# Patient Record
Sex: MALE | Race: WHITE | Employment: OTHER | URBAN - NONMETROPOLITAN AREA
[De-identification: names, ages, dates, MRNs, and addresses within clinical notes are randomized per-mention and may not be internally consistent; named-entity substitution may affect disease eponyms.]

---

## 2017-04-05 ENCOUNTER — HOSPITAL ENCOUNTER (EMERGENCY)
Age: 37
Discharge: HOME | End: 2017-04-05
Payer: MEDICAID

## 2017-04-05 ENCOUNTER — APPOINTMENT (OUTPATIENT)
Dept: CT IMAGING | Age: 37
End: 2017-04-05
Payer: MEDICAID

## 2017-04-05 VITALS
RESPIRATION RATE: 18 BRPM | OXYGEN SATURATION: 96 % | DIASTOLIC BLOOD PRESSURE: 76 MMHG | HEART RATE: 88 BPM | SYSTOLIC BLOOD PRESSURE: 132 MMHG | BODY MASS INDEX: 31.39 KG/M2 | TEMPERATURE: 97.6 F | HEIGHT: 67 IN | WEIGHT: 200 LBS

## 2017-04-05 DIAGNOSIS — R10.84 GENERALIZED ABDOMINAL PAIN: Primary | ICD-10-CM

## 2017-04-05 DIAGNOSIS — R74.8 ELEVATED LIPASE: ICD-10-CM

## 2017-04-05 DIAGNOSIS — F10.920 ACUTE ALCOHOLIC INTOXICATION, UNCOMPLICATED (HCC): ICD-10-CM

## 2017-04-05 LAB
ACETAMINOPHEN LEVEL: <15 UG/ML
ALBUMIN SERPL-MCNC: 4.6 G/DL (ref 3.5–5.2)
ALP BLD-CCNC: 97 U/L (ref 40–130)
ALT SERPL-CCNC: 35 U/L (ref 5–41)
AMPHETAMINE SCREEN, URINE: NEGATIVE
ANION GAP SERPL CALCULATED.3IONS-SCNC: 16 MMOL/L (ref 7–19)
AST SERPL-CCNC: 34 U/L (ref 5–40)
BARBITURATE SCREEN URINE: NEGATIVE
BASOPHILS ABSOLUTE: 0 K/UL (ref 0–0.2)
BASOPHILS RELATIVE PERCENT: 0.4 % (ref 0–1)
BENZODIAZEPINE SCREEN, URINE: NEGATIVE
BILIRUB SERPL-MCNC: <0.2 MG/DL (ref 0.2–1.2)
BILIRUBIN URINE: NEGATIVE
BLOOD, URINE: NEGATIVE
BUN BLDV-MCNC: 6 MG/DL (ref 6–20)
CALCIUM SERPL-MCNC: 8.8 MG/DL (ref 8.6–10)
CANNABINOID SCREEN URINE: NEGATIVE
CHLORIDE BLD-SCNC: 109 MMOL/L (ref 98–111)
CLARITY: CLEAR
CO2: 22 MMOL/L (ref 22–29)
COCAINE METABOLITE SCREEN URINE: NEGATIVE
COLOR: NORMAL
CREAT SERPL-MCNC: 0.8 MG/DL (ref 0.5–1.2)
EOSINOPHILS ABSOLUTE: 0.1 K/UL (ref 0–0.6)
EOSINOPHILS RELATIVE PERCENT: 1.8 % (ref 0–5)
ETHANOL: 219 MG/DL (ref 0–0.08)
ETHANOL: 310 MG/DL (ref 0–0.08)
GFR NON-AFRICAN AMERICAN: >60
GLOBULIN: 2.8 G/DL
GLUCOSE BLD-MCNC: 110 MG/DL (ref 74–109)
GLUCOSE URINE: NEGATIVE MG/DL
HCT VFR BLD CALC: 44.9 % (ref 42–52)
HEMOGLOBIN: 15.1 G/DL (ref 14–18)
KETONES, URINE: NEGATIVE MG/DL
LACTIC ACID: 1.7 MG/DL (ref 0.5–1.9)
LEUKOCYTE ESTERASE, URINE: NEGATIVE
LIPASE: 75 U/L (ref 13–60)
LYMPHOCYTES ABSOLUTE: 4 K/UL (ref 1.1–4.5)
LYMPHOCYTES RELATIVE PERCENT: 54 % (ref 20–40)
Lab: NORMAL
MCH RBC QN AUTO: 31.8 PG (ref 27–31)
MCHC RBC AUTO-ENTMCNC: 33.6 G/DL (ref 33–37)
MCV RBC AUTO: 94.5 FL (ref 80–94)
MONOCYTES ABSOLUTE: 0.4 K/UL (ref 0–0.9)
MONOCYTES RELATIVE PERCENT: 5.7 % (ref 0–10)
NEUTROPHILS ABSOLUTE: 2.8 K/UL (ref 1.5–7.5)
NEUTROPHILS RELATIVE PERCENT: 37.7 % (ref 50–65)
NITRITE, URINE: NEGATIVE
OPIATE SCREEN URINE: NEGATIVE
PDW BLD-RTO: 13.6 % (ref 11.5–14.5)
PH UA: 7
PLATELET # BLD: 363 K/UL (ref 130–400)
PMV BLD AUTO: 9.5 FL (ref 7.4–10.4)
POTASSIUM SERPL-SCNC: 4.3 MMOL/L (ref 3.5–5)
PROTEIN UA: NEGATIVE MG/DL
RBC # BLD: 4.75 M/UL (ref 4.7–6.1)
SALICYLATE, SERUM: <3 MG/DL (ref 3–10)
SODIUM BLD-SCNC: 147 MMOL/L (ref 136–145)
SPECIFIC GRAVITY UA: 1
TOTAL PROTEIN: 7.4 G/DL (ref 6.6–8.7)
UROBILINOGEN, URINE: 0.2 E.U./DL
WBC # BLD: 7.4 K/UL (ref 4.8–10.8)

## 2017-04-05 PROCEDURE — G0480 DRUG TEST DEF 1-7 CLASSES: HCPCS

## 2017-04-05 PROCEDURE — 2500000003 HC RX 250 WO HCPCS: Performed by: PHYSICIAN ASSISTANT

## 2017-04-05 PROCEDURE — 96366 THER/PROPH/DIAG IV INF ADDON: CPT

## 2017-04-05 PROCEDURE — 36415 COLL VENOUS BLD VENIPUNCTURE: CPT

## 2017-04-05 PROCEDURE — 80053 COMPREHEN METABOLIC PANEL: CPT

## 2017-04-05 PROCEDURE — 74177 CT ABD & PELVIS W/CONTRAST: CPT

## 2017-04-05 PROCEDURE — 85025 COMPLETE CBC W/AUTO DIFF WBC: CPT

## 2017-04-05 PROCEDURE — 2580000003 HC RX 258: Performed by: PHYSICIAN ASSISTANT

## 2017-04-05 PROCEDURE — 81003 URINALYSIS AUTO W/O SCOPE: CPT

## 2017-04-05 PROCEDURE — 83605 ASSAY OF LACTIC ACID: CPT

## 2017-04-05 PROCEDURE — 6360000002 HC RX W HCPCS: Performed by: PHYSICIAN ASSISTANT

## 2017-04-05 PROCEDURE — 80307 DRUG TEST PRSMV CHEM ANLYZR: CPT

## 2017-04-05 PROCEDURE — 96365 THER/PROPH/DIAG IV INF INIT: CPT

## 2017-04-05 PROCEDURE — 96375 TX/PRO/DX INJ NEW DRUG ADDON: CPT

## 2017-04-05 PROCEDURE — 6360000004 HC RX CONTRAST MEDICATION: Performed by: PHYSICIAN ASSISTANT

## 2017-04-05 PROCEDURE — 83690 ASSAY OF LIPASE: CPT

## 2017-04-05 PROCEDURE — 99283 EMERGENCY DEPT VISIT LOW MDM: CPT | Performed by: PHYSICIAN ASSISTANT

## 2017-04-05 PROCEDURE — 99284 EMERGENCY DEPT VISIT MOD MDM: CPT

## 2017-04-05 RX ORDER — 0.9 % SODIUM CHLORIDE 0.9 %
1000 INTRAVENOUS SOLUTION INTRAVENOUS ONCE
Status: COMPLETED | OUTPATIENT
Start: 2017-04-05 | End: 2017-04-05

## 2017-04-05 RX ORDER — ONDANSETRON 2 MG/ML
4 INJECTION INTRAMUSCULAR; INTRAVENOUS ONCE
Status: COMPLETED | OUTPATIENT
Start: 2017-04-05 | End: 2017-04-05

## 2017-04-05 RX ADMIN — IOVERSOL 90 ML: 741 INJECTION INTRA-ARTERIAL; INTRAVENOUS at 08:35

## 2017-04-05 RX ADMIN — SODIUM CHLORIDE 1000 ML: 9 INJECTION, SOLUTION INTRAVENOUS at 07:32

## 2017-04-05 RX ADMIN — FOLIC ACID: 5 INJECTION, SOLUTION INTRAMUSCULAR; INTRAVENOUS; SUBCUTANEOUS at 08:48

## 2017-04-05 RX ADMIN — ONDANSETRON 4 MG: 2 INJECTION INTRAMUSCULAR; INTRAVENOUS at 07:32

## 2017-04-05 ASSESSMENT — ENCOUNTER SYMPTOMS
CONSTIPATION: 0
VOMITING: 0
SORE THROAT: 0
WHEEZING: 0
ABDOMINAL PAIN: 1
DIARRHEA: 1
CHEST TIGHTNESS: 0
BACK PAIN: 0
NAUSEA: 0
COUGH: 0
RHINORRHEA: 0
STRIDOR: 0
ABDOMINAL DISTENTION: 0
COLOR CHANGE: 0
SHORTNESS OF BREATH: 0

## 2017-04-05 ASSESSMENT — PAIN DESCRIPTION - ORIENTATION: ORIENTATION: RIGHT;LOWER

## 2017-04-05 ASSESSMENT — PAIN DESCRIPTION - LOCATION: LOCATION: ABDOMEN

## 2017-04-05 ASSESSMENT — PAIN DESCRIPTION - PAIN TYPE: TYPE: ACUTE PAIN

## 2017-04-05 ASSESSMENT — PAIN SCALES - GENERAL: PAINLEVEL_OUTOF10: 9

## 2017-05-03 ENCOUNTER — APPOINTMENT (OUTPATIENT)
Dept: LAB | Facility: HOSPITAL | Age: 37
End: 2017-05-03

## 2017-05-03 ENCOUNTER — OFFICE VISIT (OUTPATIENT)
Dept: GASTROENTEROLOGY | Facility: CLINIC | Age: 37
End: 2017-05-03

## 2017-05-03 VITALS
DIASTOLIC BLOOD PRESSURE: 74 MMHG | WEIGHT: 190.2 LBS | BODY MASS INDEX: 29.85 KG/M2 | SYSTOLIC BLOOD PRESSURE: 114 MMHG | HEIGHT: 67 IN | HEART RATE: 101 BPM | OXYGEN SATURATION: 99 % | TEMPERATURE: 97.8 F

## 2017-05-03 DIAGNOSIS — R19.7 DIARRHEA, UNSPECIFIED TYPE: ICD-10-CM

## 2017-05-03 DIAGNOSIS — R19.7 DIARRHEA, UNSPECIFIED TYPE: Primary | ICD-10-CM

## 2017-05-03 DIAGNOSIS — R10.84 GENERALIZED ABDOMINAL PAIN: Primary | ICD-10-CM

## 2017-05-03 PROCEDURE — 99203 OFFICE O/P NEW LOW 30 MIN: CPT | Performed by: NURSE PRACTITIONER

## 2017-05-03 RX ORDER — QUETIAPINE FUMARATE 300 MG/1
TABLET, FILM COATED ORAL
Refills: 0 | COMMUNITY
Start: 2017-04-28

## 2017-05-03 RX ORDER — DIVALPROEX SODIUM 500 MG/1
TABLET, EXTENDED RELEASE ORAL
Refills: 0 | COMMUNITY
Start: 2017-04-28

## 2017-05-03 RX ORDER — CEPHALEXIN 500 MG/1
CAPSULE ORAL
Refills: 0 | Status: ON HOLD | COMMUNITY
Start: 2017-04-28 | End: 2017-05-18

## 2017-05-03 RX ORDER — METHOCARBAMOL 500 MG/1
TABLET, FILM COATED ORAL
Refills: 1 | COMMUNITY
Start: 2017-04-28 | End: 2021-03-23

## 2017-05-03 RX ORDER — CLONAZEPAM 1 MG/1
TABLET ORAL
Refills: 0 | COMMUNITY
Start: 2017-04-28

## 2017-05-05 ENCOUNTER — APPOINTMENT (OUTPATIENT)
Dept: LAB | Facility: HOSPITAL | Age: 37
End: 2017-05-05

## 2017-05-05 LAB
ADV 40+41 DNA STL QL NAA+NON-PROBE: NOT DETECTED
ASTRO TYP 1-8 RNA STL QL NAA+NON-PROBE: NOT DETECTED
C CAYETANENSIS DNA STL QL NAA+NON-PROBE: NOT DETECTED
C DIFF TOX GENS STL QL NAA+PROBE: DETECTED
CAMPY SP DNA.DIARRHEA STL QL NAA+PROBE: NOT DETECTED
CRYPTOSP STL CULT: NOT DETECTED
E COLI DNA SPEC QL NAA+PROBE: NOT DETECTED
E HISTOLYT AG STL-ACNC: NOT DETECTED
EAEC PAA PLAS AGGR+AATA ST NAA+NON-PRB: NOT DETECTED
EC STX1+STX2 GENES STL QL NAA+NON-PROBE: NOT DETECTED
EPEC EAE GENE STL QL NAA+NON-PROBE: NOT DETECTED
ETEC LTA+ST1A+ST1B TOX ST NAA+NON-PROBE: NOT DETECTED
G LAMBLIA DNA SPEC QL NAA+PROBE: NOT DETECTED
NOROVIRUS GI+II RNA STL QL NAA+NON-PROBE: NOT DETECTED
P SHIGELLOIDES DNA STL QL NAA+NON-PROBE: NOT DETECTED
RV RNA STL NAA+PROBE: NOT DETECTED
SALMONELLA DNA SPEC QL NAA+PROBE: NOT DETECTED
SAPO I+II+IV+V RNA STL QL NAA+NON-PROBE: NOT DETECTED
SHIGELLA SP+EIEC IPAH ST NAA+NON-PROBE: NOT DETECTED
V CHOLERAE DNA SPEC QL NAA+PROBE: NOT DETECTED
VIBRIO DNA SPEC NAA+PROBE: NOT DETECTED
YERSINIA STL CULT: NOT DETECTED

## 2017-05-05 PROCEDURE — 87507 IADNA-DNA/RNA PROBE TQ 12-25: CPT

## 2017-05-05 PROCEDURE — 87999 UNLISTED MICROBIOLOGY PX: CPT | Performed by: NURSE PRACTITIONER

## 2017-05-08 ENCOUNTER — TELEPHONE (OUTPATIENT)
Dept: GASTROENTEROLOGY | Facility: CLINIC | Age: 37
End: 2017-05-08

## 2017-05-08 RX ORDER — METRONIDAZOLE 500 MG/1
500 TABLET ORAL 3 TIMES DAILY
Qty: 30 TABLET | Refills: 0 | Status: SHIPPED | OUTPATIENT
Start: 2017-05-08 | End: 2017-05-18

## 2017-05-17 ENCOUNTER — ANESTHESIA EVENT (OUTPATIENT)
Dept: GASTROENTEROLOGY | Facility: HOSPITAL | Age: 37
End: 2017-05-17

## 2017-05-18 ENCOUNTER — HOSPITAL ENCOUNTER (OUTPATIENT)
Facility: HOSPITAL | Age: 37
Setting detail: HOSPITAL OUTPATIENT SURGERY
Discharge: HOME | End: 2017-05-18
Attending: INTERNAL MEDICINE | Admitting: INTERNAL MEDICINE

## 2017-05-18 ENCOUNTER — ANESTHESIA (OUTPATIENT)
Dept: GASTROENTEROLOGY | Facility: HOSPITAL | Age: 37
End: 2017-05-18

## 2017-05-18 ENCOUNTER — TELEPHONE (OUTPATIENT)
Dept: GASTROENTEROLOGY | Facility: CLINIC | Age: 37
End: 2017-05-18

## 2017-05-18 VITALS
WEIGHT: 187 LBS | TEMPERATURE: 97.8 F | HEIGHT: 67 IN | SYSTOLIC BLOOD PRESSURE: 135 MMHG | HEART RATE: 79 BPM | DIASTOLIC BLOOD PRESSURE: 71 MMHG | BODY MASS INDEX: 29.35 KG/M2 | RESPIRATION RATE: 13 BRPM | OXYGEN SATURATION: 100 %

## 2017-05-18 DIAGNOSIS — R10.84 GENERALIZED ABDOMINAL PAIN: ICD-10-CM

## 2017-05-18 PROCEDURE — 88305 TISSUE EXAM BY PATHOLOGIST: CPT | Performed by: INTERNAL MEDICINE

## 2017-05-18 PROCEDURE — 45385 COLONOSCOPY W/LESION REMOVAL: CPT | Performed by: INTERNAL MEDICINE

## 2017-05-18 PROCEDURE — 25010000002 PROPOFOL 10 MG/ML EMULSION: Performed by: NURSE ANESTHETIST, CERTIFIED REGISTERED

## 2017-05-18 RX ORDER — SODIUM CHLORIDE 0.9 % (FLUSH) 0.9 %
1-10 SYRINGE (ML) INJECTION AS NEEDED
Status: CANCELLED | OUTPATIENT
Start: 2017-05-18

## 2017-05-18 RX ORDER — SODIUM CHLORIDE 0.9 % (FLUSH) 0.9 %
1-10 SYRINGE (ML) INJECTION AS NEEDED
Status: DISCONTINUED | OUTPATIENT
Start: 2017-05-18 | End: 2017-05-18 | Stop reason: HOSPADM

## 2017-05-18 RX ORDER — SODIUM CHLORIDE 9 MG/ML
100 INJECTION, SOLUTION INTRAVENOUS CONTINUOUS
Status: DISCONTINUED | OUTPATIENT
Start: 2017-05-18 | End: 2017-05-18 | Stop reason: HOSPADM

## 2017-05-18 RX ORDER — LIDOCAINE HYDROCHLORIDE 20 MG/ML
INJECTION, SOLUTION INFILTRATION; PERINEURAL AS NEEDED
Status: DISCONTINUED | OUTPATIENT
Start: 2017-05-18 | End: 2017-05-18 | Stop reason: SURG

## 2017-05-18 RX ORDER — SODIUM CHLORIDE 9 MG/ML
9 INJECTION, SOLUTION INTRAVENOUS CONTINUOUS PRN
Status: CANCELLED | OUTPATIENT
Start: 2017-05-18

## 2017-05-18 RX ORDER — PROPOFOL 10 MG/ML
VIAL (ML) INTRAVENOUS AS NEEDED
Status: DISCONTINUED | OUTPATIENT
Start: 2017-05-18 | End: 2017-05-18 | Stop reason: SURG

## 2017-05-18 RX ADMIN — PROPOFOL 50 MG: 10 INJECTION, EMULSION INTRAVENOUS at 12:23

## 2017-05-18 RX ADMIN — PROPOFOL 100 MG: 10 INJECTION, EMULSION INTRAVENOUS at 12:21

## 2017-05-18 RX ADMIN — PROPOFOL 50 MG: 10 INJECTION, EMULSION INTRAVENOUS at 12:27

## 2017-05-18 RX ADMIN — PROPOFOL 50 MG: 10 INJECTION, EMULSION INTRAVENOUS at 12:25

## 2017-05-18 RX ADMIN — PROPOFOL 50 MG: 10 INJECTION, EMULSION INTRAVENOUS at 12:22

## 2017-05-18 RX ADMIN — LIDOCAINE HYDROCHLORIDE 40 MG: 20 INJECTION, SOLUTION INFILTRATION; PERINEURAL at 12:21

## 2017-05-18 RX ADMIN — PROPOFOL 50 MG: 10 INJECTION, EMULSION INTRAVENOUS at 12:29

## 2017-05-18 RX ADMIN — SODIUM CHLORIDE 100 ML/HR: 9 INJECTION, SOLUTION INTRAVENOUS at 11:46

## 2017-05-19 LAB
CYTO UR: NORMAL
LAB AP CASE REPORT: NORMAL
LAB AP CLINICAL INFORMATION: NORMAL
Lab: NORMAL
PATH REPORT.FINAL DX SPEC: NORMAL
PATH REPORT.GROSS SPEC: NORMAL

## 2017-05-29 ENCOUNTER — LAB REQUISITION (OUTPATIENT)
Dept: LAB | Facility: HOSPITAL | Age: 37
End: 2017-05-29

## 2017-05-29 DIAGNOSIS — Z00.00 ENCOUNTER FOR GENERAL ADULT MEDICAL EXAMINATION WITHOUT ABNORMAL FINDINGS: ICD-10-CM

## 2017-05-29 LAB
AMPHET+METHAMPHET UR QL: NEGATIVE
BARBITURATES UR QL SCN: NEGATIVE
BENZODIAZ UR QL SCN: NEGATIVE
CANNABINOIDS SERPL QL: NEGATIVE
COCAINE UR QL: NEGATIVE
METHADONE UR QL SCN: NEGATIVE
OPIATES UR QL: NEGATIVE
PCP UR QL SCN: NEGATIVE

## 2017-05-29 PROCEDURE — 80307 DRUG TEST PRSMV CHEM ANLYZR: CPT | Performed by: FAMILY MEDICINE

## 2017-06-15 ENCOUNTER — HOSPITAL ENCOUNTER (EMERGENCY)
Facility: HOSPITAL | Age: 37
Discharge: HOME | End: 2017-06-15
Attending: EMERGENCY MEDICINE | Admitting: EMERGENCY MEDICINE

## 2017-06-15 ENCOUNTER — APPOINTMENT (OUTPATIENT)
Dept: CT IMAGING | Facility: HOSPITAL | Age: 37
End: 2017-06-15

## 2017-06-15 VITALS
WEIGHT: 192.38 LBS | HEART RATE: 82 BPM | RESPIRATION RATE: 16 BRPM | BODY MASS INDEX: 30.19 KG/M2 | SYSTOLIC BLOOD PRESSURE: 131 MMHG | HEIGHT: 67 IN | DIASTOLIC BLOOD PRESSURE: 79 MMHG | OXYGEN SATURATION: 98 % | TEMPERATURE: 97.5 F

## 2017-06-15 DIAGNOSIS — R51.9 HEADACHE, UNSPECIFIED HEADACHE TYPE: Primary | ICD-10-CM

## 2017-06-15 DIAGNOSIS — M79.18 BUTTOCK PAIN: ICD-10-CM

## 2017-06-15 LAB
ALBUMIN SERPL-MCNC: 4.1 G/DL (ref 3.5–5)
ALBUMIN/GLOB SERPL: 1.6 G/DL (ref 1.1–2.5)
ALP SERPL-CCNC: 72 U/L (ref 24–120)
ALT SERPL W P-5'-P-CCNC: 38 U/L (ref 0–54)
ANION GAP SERPL CALCULATED.3IONS-SCNC: 11 MMOL/L (ref 4–13)
AST SERPL-CCNC: 29 U/L (ref 7–45)
BASOPHILS # BLD AUTO: 0.05 10*3/MM3 (ref 0–0.2)
BASOPHILS NFR BLD AUTO: 0.7 % (ref 0–2)
BILIRUB SERPL-MCNC: 0.3 MG/DL (ref 0.1–1)
BUN BLD-MCNC: 10 MG/DL (ref 5–21)
BUN/CREAT SERPL: 10.1 (ref 7–25)
CALCIUM SPEC-SCNC: 9 MG/DL (ref 8.4–10.4)
CHLORIDE SERPL-SCNC: 105 MMOL/L (ref 98–110)
CO2 SERPL-SCNC: 26 MMOL/L (ref 24–31)
CREAT BLD-MCNC: 0.99 MG/DL (ref 0.5–1.4)
CRP SERPL-MCNC: <0.5 MG/DL (ref 0–0.99)
DEPRECATED RDW RBC AUTO: 45.9 FL (ref 40–54)
EOSINOPHIL # BLD AUTO: 0.2 10*3/MM3 (ref 0–0.7)
EOSINOPHIL NFR BLD AUTO: 2.9 % (ref 0–4)
ERYTHROCYTE [DISTWIDTH] IN BLOOD BY AUTOMATED COUNT: 13.3 % (ref 12–15)
GFR SERPL CREATININE-BSD FRML MDRD: 84 ML/MIN/1.73
GLOBULIN UR ELPH-MCNC: 2.5 GM/DL
GLUCOSE BLD-MCNC: 98 MG/DL (ref 70–100)
HCT VFR BLD AUTO: 39.6 % (ref 40–52)
HGB BLD-MCNC: 13.4 G/DL (ref 14–18)
IMM GRANULOCYTES # BLD: 0.01 10*3/MM3 (ref 0–0.03)
IMM GRANULOCYTES NFR BLD: 0.1 % (ref 0–5)
LYMPHOCYTES # BLD AUTO: 2.58 10*3/MM3 (ref 0.72–4.86)
LYMPHOCYTES NFR BLD AUTO: 38 % (ref 15–45)
MCH RBC QN AUTO: 31.8 PG (ref 28–32)
MCHC RBC AUTO-ENTMCNC: 33.8 G/DL (ref 33–36)
MCV RBC AUTO: 94.1 FL (ref 82–95)
MONOCYTES # BLD AUTO: 0.5 10*3/MM3 (ref 0.19–1.3)
MONOCYTES NFR BLD AUTO: 7.4 % (ref 4–12)
NEUTROPHILS # BLD AUTO: 3.45 10*3/MM3 (ref 1.87–8.4)
NEUTROPHILS NFR BLD AUTO: 50.9 % (ref 39–78)
PLATELET # BLD AUTO: 337 10*3/MM3 (ref 130–400)
PMV BLD AUTO: 9.6 FL (ref 6–12)
POTASSIUM BLD-SCNC: 4 MMOL/L (ref 3.5–5.3)
PROT SERPL-MCNC: 6.6 G/DL (ref 6.3–8.7)
RBC # BLD AUTO: 4.21 10*6/MM3 (ref 4.8–5.9)
SODIUM BLD-SCNC: 142 MMOL/L (ref 135–145)
WBC NRBC COR # BLD: 6.79 10*3/MM3 (ref 4.8–10.8)

## 2017-06-15 PROCEDURE — 99283 EMERGENCY DEPT VISIT LOW MDM: CPT

## 2017-06-15 PROCEDURE — 0 IOPAMIDOL 61 % SOLUTION: Performed by: EMERGENCY MEDICINE

## 2017-06-15 PROCEDURE — 86140 C-REACTIVE PROTEIN: CPT | Performed by: EMERGENCY MEDICINE

## 2017-06-15 PROCEDURE — 70450 CT HEAD/BRAIN W/O DYE: CPT

## 2017-06-15 PROCEDURE — 72193 CT PELVIS W/DYE: CPT

## 2017-06-15 PROCEDURE — 85025 COMPLETE CBC W/AUTO DIFF WBC: CPT | Performed by: EMERGENCY MEDICINE

## 2017-06-15 PROCEDURE — 80053 COMPREHEN METABOLIC PANEL: CPT | Performed by: EMERGENCY MEDICINE

## 2017-06-15 RX ADMIN — IOPAMIDOL 100 ML: 612 INJECTION, SOLUTION INTRAVENOUS at 04:48

## 2017-06-15 NOTE — ED PROVIDER NOTES
Subjective   HPI Comments: Patient came to the ED complaining of a headache in the part of his scalp where he is living here for the past couple months along with that he also is complaining of swelling in his buttcheeks     Patient is a 37 y.o. male presenting with general illness.   Illness   Location:  Headache / buttock pain   Severity:  Moderate  Onset quality:  Gradual  Timing:  Constant  Progression:  Worsening  Chronicity:  New  Associated symptoms: abdominal pain    Associated symptoms: no chest pain, no congestion, no cough, no ear pain, no fatigue, no fever, no headaches, no loss of consciousness, no nausea, no rhinorrhea, no shortness of breath, no sore throat, no vomiting and no wheezing        Review of Systems   Constitutional: Negative.  Negative for chills, fatigue and fever.   HENT: Negative.  Negative for congestion, ear pain, rhinorrhea and sore throat.    Respiratory: Negative.  Negative for cough, chest tightness, shortness of breath, wheezing and stridor.    Cardiovascular: Negative.  Negative for chest pain.   Gastrointestinal: Positive for abdominal pain. Negative for abdominal distention, nausea and vomiting.   Endocrine: Negative.    Genitourinary: Negative.  Negative for difficulty urinating and flank pain.   Musculoskeletal: Negative.    Skin: Negative.  Negative for color change.   Neurological: Negative.  Negative for dizziness, loss of consciousness and headaches.   All other systems reviewed and are negative.      Past Medical History:   Diagnosis Date   • Bipolar 1 disorder    • Chronic neck pain    • Heart murmur    • Panic attack        Allergies   Allergen Reactions   • Droperidol        Past Surgical History:   Procedure Laterality Date   • ANKLE SURGERY      x2   • COLONOSCOPY N/A 5/18/2017    Procedure: COLONOSCOPY WITH ANESTHESIA;  Surgeon: Paresh Robiosn DO;  Location: [facility] ENDOSCOPY;  Service:    • NECK SURGERY      x2   • NOSE SURGERY     • RHINOPLASTY     • WRIST  SURGERY Right        Family History   Problem Relation Age of Onset   • Colon cancer Neg Hx    • Colon polyps Neg Hx        Social History     Social History   • Marital status: Single     Spouse name: N/A   • Number of children: N/A   • Years of education: N/A     Social History Main Topics   • Smoking status: Current Every Day Smoker     Packs/day: 1.50     Years: 30.00     Types: Cigarettes   • Smokeless tobacco: Current User     Types: Snuff   • Alcohol use Yes      Comment: social   • Drug use: No   • Sexual activity: Not Asked     Other Topics Concern   • None     Social History Narrative           Objective   Physical Exam   Constitutional: He is oriented to person, place, and time. He appears well-developed and well-nourished.   HENT:   Head: Normocephalic and atraumatic.   Eyes: Conjunctivae and EOM are normal. Pupils are equal, round, and reactive to light.   Neck: Normal range of motion. Neck supple.   Cardiovascular: Normal rate, regular rhythm, normal heart sounds and intact distal pulses.    Pulmonary/Chest: Effort normal and breath sounds normal.   Abdominal: Soft. Bowel sounds are normal.   Musculoskeletal: Normal range of motion.   Neurological: He is alert and oriented to person, place, and time. He has normal reflexes.   Skin: Skin is warm and dry.   Psychiatric: He has a normal mood and affect.   Nursing note and vitals reviewed.      Procedures         ED Course  ED Course   Comment By Time   Pts ct scans are neg and his physical exam is neg for any perirectal abscess . Ct head is neg and the exanm area of scal reveals localized alopeciaa but no abscess Sedrick Bradshaw MD 06/15 0580                  [facility]    Final diagnoses:   Headache, unspecified headache type   Buttock pain            Sedrick Bradshaw MD  06/15/17 0597

## 2017-07-12 PROCEDURE — 88305 TISSUE EXAM BY PATHOLOGIST: CPT | Performed by: OTOLARYNGOLOGY

## 2017-07-13 ENCOUNTER — LAB REQUISITION (OUTPATIENT)
Dept: LAB | Facility: HOSPITAL | Age: 37
End: 2017-07-13

## 2017-07-13 DIAGNOSIS — Z00.00 ENCOUNTER FOR GENERAL ADULT MEDICAL EXAMINATION WITHOUT ABNORMAL FINDINGS: ICD-10-CM

## 2017-08-10 ENCOUNTER — OFFICE VISIT (OUTPATIENT)
Dept: PSYCHIATRY | Age: 37
End: 2017-08-10
Payer: MEDICAID

## 2017-08-10 DIAGNOSIS — F31.9 BIPOLAR 1 DISORDER (HCC): Primary | ICD-10-CM

## 2017-08-10 PROCEDURE — 99999 PR OFFICE/OUTPT VISIT,PROCEDURE ONLY: CPT | Performed by: COUNSELOR

## 2017-08-10 PROCEDURE — 90791 PSYCH DIAGNOSTIC EVALUATION: CPT | Performed by: COUNSELOR

## 2017-08-24 ENCOUNTER — OFFICE VISIT (OUTPATIENT)
Dept: PSYCHIATRY | Age: 37
End: 2017-08-24
Payer: MEDICAID

## 2017-08-24 VITALS
HEIGHT: 67 IN | BODY MASS INDEX: 28.02 KG/M2 | OXYGEN SATURATION: 97 % | HEART RATE: 84 BPM | WEIGHT: 178.5 LBS | DIASTOLIC BLOOD PRESSURE: 85 MMHG | SYSTOLIC BLOOD PRESSURE: 131 MMHG

## 2017-08-24 DIAGNOSIS — F31.9 BIPOLAR 1 DISORDER (HCC): Primary | ICD-10-CM

## 2017-08-24 PROCEDURE — 90834 PSYTX W PT 45 MINUTES: CPT | Performed by: COUNSELOR

## 2017-08-24 PROCEDURE — 99999 PR OFFICE/OUTPT VISIT,PROCEDURE ONLY: CPT | Performed by: COUNSELOR

## 2017-08-24 RX ORDER — BUSPIRONE HYDROCHLORIDE 5 MG/1
TABLET ORAL
Refills: 0 | COMMUNITY
Start: 2017-08-21 | End: 2017-08-31

## 2017-08-24 RX ORDER — QUETIAPINE FUMARATE 300 MG/1
TABLET, FILM COATED ORAL
COMMUNITY
Start: 2017-04-28 | End: 2017-08-31 | Stop reason: DRUGHIGH

## 2017-08-24 RX ORDER — CYCLOBENZAPRINE HCL 10 MG
TABLET ORAL
Refills: 0 | COMMUNITY
Start: 2017-08-04 | End: 2017-10-09

## 2017-08-24 RX ORDER — CLONAZEPAM 1 MG/1
TABLET ORAL
COMMUNITY
Start: 2017-04-28 | End: 2017-08-31

## 2017-08-24 RX ORDER — DIVALPROEX SODIUM 500 MG/1
TABLET, EXTENDED RELEASE ORAL
COMMUNITY
Start: 2017-04-28 | End: 2017-10-09

## 2017-08-31 ENCOUNTER — OFFICE VISIT (OUTPATIENT)
Dept: PSYCHIATRY | Age: 37
End: 2017-08-31
Payer: MEDICAID

## 2017-08-31 VITALS
BODY MASS INDEX: 27.28 KG/M2 | HEART RATE: 91 BPM | HEIGHT: 67 IN | OXYGEN SATURATION: 94 % | SYSTOLIC BLOOD PRESSURE: 120 MMHG | DIASTOLIC BLOOD PRESSURE: 85 MMHG | WEIGHT: 173.8 LBS

## 2017-08-31 DIAGNOSIS — F31.9 BIPOLAR 1 DISORDER (HCC): Primary | ICD-10-CM

## 2017-08-31 PROCEDURE — 90791 PSYCH DIAGNOSTIC EVALUATION: CPT | Performed by: NURSE PRACTITIONER

## 2017-08-31 RX ORDER — QUETIAPINE FUMARATE 100 MG/1
TABLET, FILM COATED ORAL
Qty: 25 TABLET | Refills: 0 | Status: SHIPPED | OUTPATIENT
Start: 2017-08-31 | End: 2017-09-08 | Stop reason: DRUGHIGH

## 2017-08-31 RX ORDER — KETOCONAZOLE 20 MG/ML
SHAMPOO TOPICAL
Refills: 2 | COMMUNITY
Start: 2017-08-25 | End: 2017-10-09

## 2017-09-08 ENCOUNTER — OFFICE VISIT (OUTPATIENT)
Dept: PSYCHIATRY | Age: 37
End: 2017-09-08
Payer: MEDICAID

## 2017-09-08 VITALS
DIASTOLIC BLOOD PRESSURE: 86 MMHG | OXYGEN SATURATION: 96 % | HEART RATE: 107 BPM | HEIGHT: 67 IN | SYSTOLIC BLOOD PRESSURE: 135 MMHG | WEIGHT: 170 LBS | BODY MASS INDEX: 26.68 KG/M2

## 2017-09-08 DIAGNOSIS — F31.9 BIPOLAR 1 DISORDER (HCC): Primary | ICD-10-CM

## 2017-09-08 PROCEDURE — 99999 PR OFFICE/OUTPT VISIT,PROCEDURE ONLY: CPT | Performed by: NURSE PRACTITIONER

## 2017-09-08 PROCEDURE — 90834 PSYTX W PT 45 MINUTES: CPT | Performed by: NURSE PRACTITIONER

## 2017-09-08 RX ORDER — QUETIAPINE FUMARATE 300 MG/1
TABLET, FILM COATED ORAL
Qty: 30 TABLET | Refills: 0 | Status: SHIPPED | OUTPATIENT
Start: 2017-09-08 | End: 2017-10-19 | Stop reason: SDUPTHER

## 2017-09-08 RX ORDER — QUETIAPINE FUMARATE 100 MG/1
TABLET, FILM COATED ORAL
Qty: 25 TABLET | Refills: 0 | Status: SHIPPED | OUTPATIENT
Start: 2017-09-08 | End: 2017-10-09

## 2017-10-09 ENCOUNTER — OFFICE VISIT (OUTPATIENT)
Dept: PSYCHIATRY | Age: 37
End: 2017-10-09
Payer: MEDICAID

## 2017-10-09 VITALS
HEART RATE: 84 BPM | OXYGEN SATURATION: 96 % | WEIGHT: 173.4 LBS | BODY MASS INDEX: 27.16 KG/M2 | SYSTOLIC BLOOD PRESSURE: 131 MMHG | DIASTOLIC BLOOD PRESSURE: 84 MMHG

## 2017-10-09 DIAGNOSIS — F31.9 BIPOLAR 1 DISORDER (HCC): Primary | ICD-10-CM

## 2017-10-09 PROCEDURE — 90832 PSYTX W PT 30 MINUTES: CPT | Performed by: COUNSELOR

## 2017-10-09 PROCEDURE — 99999 PR OFFICE/OUTPT VISIT,PROCEDURE ONLY: CPT | Performed by: COUNSELOR

## 2017-10-09 RX ORDER — HYDROCODONE BITARTRATE AND ACETAMINOPHEN 5; 325 MG/1; MG/1
TABLET ORAL
Refills: 0 | COMMUNITY
Start: 2017-09-11 | End: 2019-09-04 | Stop reason: ALTCHOICE

## 2017-10-09 NOTE — PROGRESS NOTES
behavior No  Speech    normal rate and normal volume  Mood    Anxious  Depressed  Affect    anxiety  Thought Content    paranoid thoughts  Thought Process    goal directed  Associations    logical connections  Insight    Good  Judgment    Intact  Orientation    oriented to person, place, time, and general circumstances  Memory    recent and remote memory intact  Attention/Concentration    intact  Morbid ideation No  Suicide Assessment    no suicidal ideation      History:    Medications:   Current Outpatient Prescriptions   Medication Sig Dispense Refill    QUEtiapine (SEROQUEL) 100 MG tablet Take two tablets bid x three days, then two tablets qam, three tablets qhs x three days (titration of med) 25 tablet 0    QUEtiapine (SEROQUEL) 300 MG tablet Take one tablet bid 30 tablet 0    ketoconazole (NIZORAL) 2 % shampoo APPLY TO SCALP FOR 5 MINUTES BEFORE RINSING TWICE A WEEK  2    cyclobenzaprine (FLEXERIL) 10 MG tablet TK 1 T PO D PRN FOR MUSCLE SPASMS  0    divalproex (DEPAKOTE ER) 500 MG extended release tablet TK 2 TS PO HS       No current facility-administered medications for this visit. Social History:   Social History     Social History    Marital status: Single     Spouse name: N/A    Number of children: N/A    Years of education: N/A     Occupational History    Not on file. Social History Main Topics    Smoking status: Former Smoker    Smokeless tobacco: Not on file    Alcohol use Not on file    Drug use: Unknown    Sexual activity: Not on file     Other Topics Concern    Not on file     Social History Narrative    No narrative on file       TOBACCO:   reports that he has quit smoking. He does not have any smokeless tobacco history on file. ETOH:   has no alcohol history on file. Family History:   No family history on file. Diagnosis:    Bipolar 1 D/O  No past medical history on file.   Problems related to the social environment, Occupational problems and Other psychosocial

## 2017-10-19 NOTE — TELEPHONE ENCOUNTER
management for arthritis in neck and back. Has been to one appt and has follow up appt. on Monday. Per Brit eWst the clinic does not want to prescribe opiates. Pt is fine with this. Current Meds:    Prior to Admission medications    Medication Sig Start Date End Date Taking? Authorizing Provider   HYDROcodone-acetaminophen (NORCO) 5-325 MG per tablet TK 1 T PO D TO BID PRN FOR SEVERE PAIN. MUST LAST 30 DAYS 9/11/17   Historical Provider, MD   QUEtiapine (SEROQUEL) 300 MG tablet Take one tablet bid 9/8/17   ALO Preston           MSE:  Patient is  A & O x3. Appearance:  well-appearing appropriately dressed for season and age. Cognition:  Recent memory intact , remote memory intact , good fund of knowledge, average  intelligence level. Speech:  pressured  Language: Naming: intact; Word Finding: intact  Conversation no evidence of delusions  Behavior:  Cooperative and Good eye contact  Mood: Happy, anxious, tense, manic  Affect: congruent with mood  Thought Content: no evidence of overt psychosis, delusional thought or suicidal /homicidal ideation or plan  Thought Process: linear, goal directed, coherent, rapid and circumstantial  Judgement Insight:  fair  Gait and Station:normal gait and station   Musculoskeletal: WNL      Assesment: No diagnosis found. [place] report reviewed per  req. Plan: Increase Seroquel to 300 mg bid (titration of med)  1. The risks, benefits, side effects, indications, contraindications, and adverse effects of the medications have been discussed. yes   2. The pt has verbalized understanding and has capacity to give informed consent. 3. The [place] Seg[place] report has been reviewed according to [facility] regulations. 4. Supportive therapy offered. 5. Follow up: No Follow-up on file. 6. The patient has been advised to call with any problems. 7. Controlled substance Treatment Plan: No Rx.   8. The above listed medications have been continued, modifications in meds and other orders/labs

## 2017-10-20 RX ORDER — QUETIAPINE FUMARATE 300 MG/1
TABLET, FILM COATED ORAL
Qty: 30 TABLET | Refills: 0 | Status: SHIPPED | OUTPATIENT
Start: 2017-10-20 | End: 2017-10-31 | Stop reason: DRUGHIGH

## 2017-10-31 ENCOUNTER — OFFICE VISIT (OUTPATIENT)
Dept: PSYCHIATRY | Age: 37
End: 2017-10-31
Payer: MEDICAID

## 2017-10-31 VITALS
OXYGEN SATURATION: 100 % | HEART RATE: 72 BPM | DIASTOLIC BLOOD PRESSURE: 84 MMHG | BODY MASS INDEX: 26.93 KG/M2 | SYSTOLIC BLOOD PRESSURE: 128 MMHG | WEIGHT: 171.6 LBS | HEIGHT: 67 IN

## 2017-10-31 DIAGNOSIS — F31.9 BIPOLAR 1 DISORDER (HCC): Primary | ICD-10-CM

## 2017-10-31 PROCEDURE — 90832 PSYTX W PT 30 MINUTES: CPT | Performed by: NURSE PRACTITIONER

## 2017-10-31 PROCEDURE — 99999 PR OFFICE/OUTPT VISIT,PROCEDURE ONLY: CPT | Performed by: NURSE PRACTITIONER

## 2017-10-31 RX ORDER — RANITIDINE 150 MG/1
TABLET ORAL
Refills: 0 | COMMUNITY
Start: 2017-10-11

## 2017-10-31 RX ORDER — QUETIAPINE FUMARATE 200 MG/1
TABLET, FILM COATED ORAL
Qty: 30 TABLET | Refills: 2 | Status: SHIPPED | OUTPATIENT
Start: 2017-10-31 | End: 2017-12-27 | Stop reason: SDUPTHER

## 2017-10-31 RX ORDER — QUETIAPINE FUMARATE 25 MG/1
TABLET, FILM COATED ORAL
Qty: 90 TABLET | Refills: 1 | Status: SHIPPED | OUTPATIENT
Start: 2017-10-31 | End: 2017-12-27 | Stop reason: SDUPTHER

## 2017-10-31 NOTE — PROGRESS NOTES
SEVERE PAIN. MUST LAST 30 DAYS 9/11/17   Historical Provider, MD     MSE:  Patient is  A & O x3. Appearance:  well-appearing appropriately dressed for season and age. Cognition:  Recent memory intact , remote memory intact , good fund of knowledge, average  intelligence level. Speech:  normal  Language: Naming: intact; Word Finding: intact  Conversation no evidence of delusions  Behavior:  Cooperative and Good eye contact  Mood: happy with treatment  Affect; no signs of [place], affect somewhat blunted  Thought Content: no evidence of overt psychosis, delusional thought or suicidal /homicidal ideation or plan  Thought Process: linear, goal directed and coherent  Judgement Insight:  normal and appropriate  Gait and Station:normal gait and station   Musculoskeletal: WNL    Assesment: See above   [place] report reviewed per HB req. Plan: Decrease Seroquel to 275 mg qhs to see if blunted affect lifts. He and his partner are well aware of the signs of [place] and will call with problems or questions. 1. The risks, benefits, side effects, indications, contraindications, and adverse effects of the medications have been discussed. yes   2. The pt has verbalized understanding and has capacity to give informed consent. 3. The [place] report has been reviewed according to [place] regulations. 4. Supportive therapy offered. 5. Follow up:   6. The patient has been advised to call with any problems. 7. Controlled substance Treatment Plan: No Rx  8.  The above listed medications have been continued, modifications in meds and other orders/labs as follows:

## 2017-11-06 ENCOUNTER — OFFICE VISIT (OUTPATIENT)
Dept: PSYCHIATRY | Age: 37
End: 2017-11-06
Payer: MEDICAID

## 2017-11-06 VITALS
HEART RATE: 82 BPM | HEIGHT: 67 IN | DIASTOLIC BLOOD PRESSURE: 88 MMHG | BODY MASS INDEX: 26.84 KG/M2 | OXYGEN SATURATION: 98 % | SYSTOLIC BLOOD PRESSURE: 134 MMHG | WEIGHT: 171 LBS

## 2017-11-06 DIAGNOSIS — F31.9 BIPOLAR 1 DISORDER (HCC): Primary | ICD-10-CM

## 2017-11-06 PROCEDURE — 99999 PR OFFICE/OUTPT VISIT,PROCEDURE ONLY: CPT | Performed by: COUNSELOR

## 2017-11-06 PROCEDURE — 90832 PSYTX W PT 30 MINUTES: CPT | Performed by: COUNSELOR

## 2017-12-06 ENCOUNTER — OFFICE VISIT (OUTPATIENT)
Dept: PSYCHIATRY | Age: 37
End: 2017-12-06
Payer: MEDICAID

## 2017-12-06 VITALS
OXYGEN SATURATION: 99 % | HEIGHT: 67 IN | HEART RATE: 76 BPM | SYSTOLIC BLOOD PRESSURE: 124 MMHG | DIASTOLIC BLOOD PRESSURE: 84 MMHG

## 2017-12-06 DIAGNOSIS — F31.9 BIPOLAR 1 DISORDER (HCC): Primary | ICD-10-CM

## 2017-12-06 PROCEDURE — 90832 PSYTX W PT 30 MINUTES: CPT | Performed by: COUNSELOR

## 2017-12-06 PROCEDURE — 99999 PR OFFICE/OUTPT VISIT,PROCEDURE ONLY: CPT | Performed by: COUNSELOR

## 2017-12-06 NOTE — PROGRESS NOTES
Therapy Progress Note  Tk Thakur 52, [facility]  12/6/2017  2:18 PM      Time spent with Patient: 30 minutes  This is patient's fifth  Therapy appointment. Reason for Consult:  depression, anxiety and stress  Referring Provider: No referring provider defined for this encounter. Gretta Mitchell ,a 42 y.o. male, for initial evaluation visit. Pt provided informed consent for the behavioral health program. Discussed with patient model of service to include the limits of confidentiality (i.e. abuse reporting, suicide intervention, etc.) and short-term intervention focused approach. Discussed no show and late cancellation policy. Pt indicated understanding. S:  Pt reports he continues to feel stable on his medications. He is not sleeping solid- sleeps for 3-4 hours then is wide awake and will go back to bed and sleep again for a few hours. Pt states his Seroquel has been drastically decreased since he was first on it. He feels he may not be on enough anymore. Pt is using distraction techniques for his coping skills. However, he has yet to practice or use his long-term positive coping skills such as learning how to challenge or counter his negative thoughts. Therapist encouraged pt to start practicing weekly. Pt denies SI, HI and AVH at this time. Paranoia to a minimum.      MSE:    Appearance    alert, cooperative  Appetite abnormal: still isn't feeling hungry but makes himself eat a decent amount at each meal time  Sleep disturbance Yes  Fatigue No  Loss of pleasure No  Impulsive behavior No  Speech    normal rate and normal volume  Mood    Anxious  Depressed  Affect    anxiety  Thought Content    paranoid thoughts at times  Thought Process    goal directed  Associations    logical connections  Insight    Good  Judgment    Intact  Orientation    oriented to person, place, time, and general circumstances  Memory    recent and remote memory intact  Attention/Concentration    intact  Morbid ideation No  Suicide Assessment    no suicidal ideation      History:    Medications:   Current Outpatient Prescriptions   Medication Sig Dispense Refill    metoprolol tartrate (LOPRESSOR) 25 MG tablet TK 1 T PO BID  2    ranitidine (ZANTAC) 150 MG tablet TK 1 T PO BID  0    QUEtiapine (SEROQUEL) 200 MG tablet Take one tablet qhs 30 tablet 2    QUEtiapine (SEROQUEL) 25 MG tablet Take three tablets qhs 90 tablet 1    HYDROcodone-acetaminophen (NORCO) 5-325 MG per tablet TK 1 T PO D TO BID PRN FOR SEVERE PAIN. MUST LAST 30 DAYS  0     No current facility-administered medications for this visit. Social History:   Social History     Social History    Marital status: Single     Spouse name: N/A    Number of children: N/A    Years of education: N/A     Occupational History    Not on file. Social History Main Topics    Smoking status: Former Smoker    Smokeless tobacco: Never Used    Alcohol use Not on file    Drug use: Unknown    Sexual activity: Not on file     Other Topics Concern    Not on file     Social History Narrative    No narrative on file       TOBACCO:   reports that he has quit smoking. He has never used smokeless tobacco.  ETOH:   has no alcohol history on file. Family History:   No family history on file. Diagnosis:  Bipolar 1 D/O  No past medical history on file. Problems related to the social environment and Other psychosocial and environmental problems    Plan:  1. Continue medication management  2. Continue CBT to target depression and anxiety  3.  Discuss Protective factors    Pt interventions:  Provided handout on  Protective Factors, Trained in strategies for increasing balanced thinking, Discussed and set plan for behavioral activation, Provided education, Discussed self-care (sleep, nutrition, rewarding activities, social support, exercise), Motivational Interviewing to determine importance and readiness for change, Supportive techniques, Emphasized self-care as important for managing

## 2017-12-27 ENCOUNTER — OFFICE VISIT (OUTPATIENT)
Dept: PSYCHIATRY | Age: 37
End: 2017-12-27
Payer: MEDICAID

## 2017-12-27 ENCOUNTER — TELEPHONE (OUTPATIENT)
Dept: PSYCHIATRY | Age: 37
End: 2017-12-27

## 2017-12-27 VITALS
WEIGHT: 166.2 LBS | BODY MASS INDEX: 26.09 KG/M2 | OXYGEN SATURATION: 100 % | HEART RATE: 94 BPM | SYSTOLIC BLOOD PRESSURE: 133 MMHG | HEIGHT: 67 IN | DIASTOLIC BLOOD PRESSURE: 84 MMHG

## 2017-12-27 DIAGNOSIS — F31.9 BIPOLAR 1 DISORDER (HCC): Primary | ICD-10-CM

## 2017-12-27 PROCEDURE — 99215 OFFICE O/P EST HI 40 MIN: CPT | Performed by: NURSE PRACTITIONER

## 2017-12-27 RX ORDER — CLONAZEPAM 0.5 MG/1
0.5 TABLET ORAL
Qty: 60 TABLET | Refills: 3 | Status: SHIPPED | OUTPATIENT
Start: 2017-12-27 | End: 2017-12-27 | Stop reason: SDUPTHER

## 2017-12-27 RX ORDER — CLONAZEPAM 0.5 MG/1
0.5 TABLET ORAL
Qty: 120 TABLET | Refills: 3 | Status: SHIPPED | OUTPATIENT
Start: 2017-12-27 | End: 2018-01-26 | Stop reason: SDUPTHER

## 2017-12-27 RX ORDER — QUETIAPINE FUMARATE 100 MG/1
TABLET, FILM COATED ORAL
Qty: 30 TABLET | Refills: 3 | Status: SHIPPED | OUTPATIENT
Start: 2017-12-27 | End: 2018-01-26 | Stop reason: DRUGHIGH

## 2017-12-27 RX ORDER — QUETIAPINE FUMARATE 200 MG/1
TABLET, FILM COATED ORAL
Qty: 30 TABLET | Refills: 3 | Status: SHIPPED | OUTPATIENT
Start: 2017-12-27 | End: 2018-01-26 | Stop reason: DRUGHIGH

## 2017-12-27 NOTE — TELEPHONE ENCOUNTER
Called pt Klonopin into [facility] per ROBERT Morrison NP/ Dr. Kirill Knox. Left message on pharmacist evelia.

## 2017-12-27 NOTE — PROGRESS NOTES
12/27/2017 1:05 PM   Progress Note        Kalani Gaston 1980  Psychotherapy Time Spent: 26 min      Psychotherapy Topics: health    Chief Complaint   Patient presents with    Manic Behavior         Subjective:  Patient is a 42year old  male diagnosed with bipolar disorder and presents today for follow-up. Last seen in clinic by ALO Agosto on 10/31/17 and prior records were reviewed. Patient is accompanied by partner/, Domonique Sherwood. Today patient states, \"I'm starting to notice now it's really hard to stay off the benzos. Things are going down really fast. I've dropped from 204 pounds down to 165. I can't go to Religion. I can't even go out in public. \"  Patient reports he stopped 20 years of benzo use cold [place] when he moved to [place] from [place]. Patient reports significant withdrawal, including one seizure, since discontinuing clonazepam in July. Patient was first seen here in August. Patient feels like he \"crashed so hard he made a hole. \"  Partner of three years reports patient is experiencing anxiety and panic that weren't there before. Partner notices the anxiety has escalated over the last couple of months. Patient says he is no longer taking metoprolol prescribed by PCP in [facility]. He says it makes his heart feel \"funny\" like he could just drop on the floor doing normal activities like walking or climbing stairs. Discussed history of bipolar disorder. Patient reports being managed by a psychiatrist in [place] on seroquel, depakote, and klonopin for approximately 20 years. Patient states he was unable to get in to see a local mental health provider by the time his prescriptions ran out. Patient reports side effects as follows: none. No evidence of EPS, no cogwheeling or abnormal motor movements. Absent  suicidal ideation. Reports compliance with medications as fair .      Review of Systems - 12 point review negative except anxiety  History obtained via chart

## 2018-01-26 ENCOUNTER — OFFICE VISIT (OUTPATIENT)
Dept: PSYCHIATRY | Age: 38
End: 2018-01-26
Payer: MEDICAID

## 2018-01-26 ENCOUNTER — TELEPHONE (OUTPATIENT)
Dept: PSYCHIATRY | Age: 38
End: 2018-01-26

## 2018-01-26 DIAGNOSIS — F31.9 BIPOLAR 1 DISORDER (HCC): Primary | ICD-10-CM

## 2018-01-26 PROCEDURE — 99215 OFFICE O/P EST HI 40 MIN: CPT | Performed by: NURSE PRACTITIONER

## 2018-01-26 RX ORDER — CLONAZEPAM 1 MG/1
TABLET ORAL
Qty: 60 TABLET | Refills: 0 | Status: SHIPPED | OUTPATIENT
Start: 2018-01-26 | End: 2018-02-22 | Stop reason: SDUPTHER

## 2018-01-26 RX ORDER — LAMOTRIGINE 25 MG/1
TABLET ORAL
Qty: 45 TABLET | Refills: 3 | Status: SHIPPED | OUTPATIENT
Start: 2018-01-26 | End: 2018-03-08

## 2018-01-26 RX ORDER — QUETIAPINE FUMARATE 400 MG/1
TABLET, FILM COATED ORAL
Qty: 30 TABLET | Refills: 3 | Status: SHIPPED | OUTPATIENT
Start: 2018-01-26 | End: 2018-03-08 | Stop reason: DRUGHIGH

## 2018-01-26 NOTE — TELEPHONE ENCOUNTER
Called pt Klonopin [facility] per ROBERT Ro NP/ Dr. Su Goodell. Left message on pharmacist vm.
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
## 2018-01-31 ENCOUNTER — TELEPHONE (OUTPATIENT)
Dept: PSYCHIATRY | Age: 38
End: 2018-01-31

## 2018-01-31 NOTE — TELEPHONE ENCOUNTER
Talked with patient's , Ramesh Isabel. Will send letter via email to Julia Russell and send hardcopy via [facility].

## 2018-01-31 NOTE — TELEPHONE ENCOUNTER
PT called and stated that he was to receive a letter written by Provider when he was to return court on 2-1-2018. Has not received the letter by mail and was wanting to come by office to .

## 2018-02-07 ENCOUNTER — TELEPHONE (OUTPATIENT)
Dept: PSYCHIATRY | Age: 38
End: 2018-02-07

## 2018-02-07 NOTE — TELEPHONE ENCOUNTER
Pt came into office today to check on appt. While in office he stated the new medication he was put on at last ov, Lamictal is making him angry at times. Pt was wondering if that is a side affect of this medication. Let him know Lear Antis was out of office today and would be back Friday. Pt verbalized understanding. Sending to NP for review.

## 2018-02-13 ENCOUNTER — OFFICE VISIT (OUTPATIENT)
Dept: GASTROENTEROLOGY | Facility: CLINIC | Age: 38
End: 2018-02-13

## 2018-02-13 VITALS
WEIGHT: 179 LBS | DIASTOLIC BLOOD PRESSURE: 78 MMHG | SYSTOLIC BLOOD PRESSURE: 128 MMHG | HEIGHT: 67 IN | TEMPERATURE: 97 F | OXYGEN SATURATION: 98 % | BODY MASS INDEX: 28.09 KG/M2 | HEART RATE: 90 BPM

## 2018-02-13 DIAGNOSIS — R10.30 LOWER ABDOMINAL PAIN: ICD-10-CM

## 2018-02-13 DIAGNOSIS — K62.89 ANAL OR RECTAL PAIN: Primary | ICD-10-CM

## 2018-02-13 PROCEDURE — 99213 OFFICE O/P EST LOW 20 MIN: CPT | Performed by: NURSE PRACTITIONER

## 2018-02-13 NOTE — PROGRESS NOTES
Chief Complaint   Patient presents with   • Rectal Pain     having rectal pain lowwer abdominal pain       Subjective     HPI     Increasing burning, sharp rectal pain that has been occurring persistently for over one week.  Passing stool increases pain.  He has increased fiber intake to help soften stool to make it more comfortable to for stool to pass.  Pain will radiating into abdomen and cause cramping sensation  He is unable to tell if there is any bright red blood or melena stools present as he is color blind.  He denies consumption of caffeine.      CScope (Dr Robison) 5/2017  Tubular adenoma neg for dysplasia    Past Medical History:   Diagnosis Date   • Bipolar 1 disorder    • Chronic neck pain    • Heart murmur    • Panic attack        Past Surgical History:   Procedure Laterality Date   • ANKLE SURGERY      x2   • COLONOSCOPY N/A 5/18/2017    Procedure: COLONOSCOPY WITH ANESTHESIA;  Surgeon: Paresh Robison DO;  Location: [facility] ENDOSCOPY;  Service:    • NECK SURGERY      x2   • NOSE SURGERY     • RHINOPLASTY     • WRIST SURGERY Right        Outpatient Prescriptions Marked as Taking for the 2/13/18 encounter (Office Visit) with RIP Callahan   Medication Sig Dispense Refill   • clonazePAM (KlonoPIN) 1 MG tablet TK 1 T PO TID  0   • QUEtiapine (SEROquel) 300 MG tablet TK 2 TS PO HS  0       Allergies   Allergen Reactions   • Droperidol Other (See Comments)     Shuts his body down like having  A seizure       Social History     Social History   • Marital status: Single     Spouse name: N/A   • Number of children: N/A   • Years of education: N/A     Occupational History   • Not on file.     Social History Main Topics   • Smoking status: Former Smoker     Types: Cigarettes   • Smokeless tobacco: Current User     Types: Snuff   • Alcohol use No   • Drug use: No   • Sexual activity: Not on file     Other Topics Concern   • Not on file     Social History Narrative       Family History   Problem  "Relation Age of Onset   • Family history unknown: Yes       Review of Systems   Constitutional: Negative for fatigue, fever and unexpected weight change.   HENT: Negative for hearing loss, sore throat and voice change.    Eyes: Negative for visual disturbance.   Respiratory: Negative for cough, shortness of breath and wheezing.    Cardiovascular: Negative for chest pain and palpitations.   Gastrointestinal: Positive for abdominal pain and rectal pain. Negative for blood in stool and vomiting.   Endocrine: Negative for polydipsia and polyuria.   Genitourinary: Negative for difficulty urinating, dysuria, hematuria and urgency.   Musculoskeletal: Negative for joint swelling and myalgias.   Skin: Negative for color change, rash and wound.   Neurological: Negative for dizziness, tremors, seizures and syncope.   Hematological: Does not bruise/bleed easily.   Psychiatric/Behavioral: Negative for agitation and confusion. The patient is not nervous/anxious.        Objective     Vitals:    02/13/18 1329   BP: 128/78   Pulse: 90   Temp: 97 °F (36.1 °C)   SpO2: 98%   Weight: 81.2 kg (179 lb)   Height: 170.2 cm (67\")     Body mass index is 28.04 kg/(m^2).    Physical Exam   Constitutional: He is oriented to person, place, and time. He appears well-developed and well-nourished.   HENT:   Head: Normocephalic and atraumatic.   Eyes:   Pink, Nonicteric   Neck:   Global Assessment- supple. No JVD or lymphadenopathy   Cardiovascular: Normal rate, regular rhythm and normal heart sounds.  Exam reveals no gallop and no friction rub.    No murmur heard.  Pulmonary/Chest: Effort normal and breath sounds normal. No respiratory distress. He has no wheezes. He has no rales.   Inspection: Movements-Symmetrical   Abdominal: Soft. Bowel sounds are normal. He exhibits no distension and no mass. There is no tenderness. There is no rebound and no guarding.   Neurological: He is alert and oriented to person, place, and time.   General Exam-Deemed a " reliable historian, able to converse without difficulty and Able to move all extremities without difficulty       Imaging Results (most recent)     None          Body mass index is 28.04 kg/(m^2).    Assessment/Plan     Jessee was seen today for rectal pain.    Diagnoses and all orders for this visit:    Anal or rectal pain  -     Case Request; Standing  -     Implement Anesthesia Orders Day of Procedure; Standing  -     Obtain Informed Consent; Standing  -     Case Request        SIGMOIDOSCOPY (N/A)    Explained Miralax was option to loosen stool if needed  Epsom salt bath    Risk/benefit/options explained to pt regarding proceeding with sigmoidoscopy  There are no Patient Instructions on file for this visit.

## 2018-02-15 ENCOUNTER — HOSPITAL ENCOUNTER (OUTPATIENT)
Facility: HOSPITAL | Age: 38
Setting detail: HOSPITAL OUTPATIENT SURGERY
Discharge: HOME | End: 2018-02-15
Attending: INTERNAL MEDICINE | Admitting: INTERNAL MEDICINE

## 2018-02-15 ENCOUNTER — ANESTHESIA EVENT (OUTPATIENT)
Dept: GASTROENTEROLOGY | Facility: HOSPITAL | Age: 38
End: 2018-02-15

## 2018-02-15 ENCOUNTER — ANESTHESIA (OUTPATIENT)
Dept: GASTROENTEROLOGY | Facility: HOSPITAL | Age: 38
End: 2018-02-15

## 2018-02-15 ENCOUNTER — TELEPHONE (OUTPATIENT)
Dept: PSYCHIATRY | Age: 38
End: 2018-02-15

## 2018-02-15 VITALS
WEIGHT: 174 LBS | HEIGHT: 67 IN | DIASTOLIC BLOOD PRESSURE: 77 MMHG | SYSTOLIC BLOOD PRESSURE: 114 MMHG | BODY MASS INDEX: 27.31 KG/M2 | HEART RATE: 84 BPM | OXYGEN SATURATION: 98 % | TEMPERATURE: 96.9 F | RESPIRATION RATE: 15 BRPM

## 2018-02-15 DIAGNOSIS — K62.89 ANAL OR RECTAL PAIN: ICD-10-CM

## 2018-02-15 PROCEDURE — 25010000002 PROPOFOL 10 MG/ML EMULSION: Performed by: NURSE ANESTHETIST, CERTIFIED REGISTERED

## 2018-02-15 PROCEDURE — 45338 SIGMOIDOSCOPY W/TUMR REMOVE: CPT | Performed by: INTERNAL MEDICINE

## 2018-02-15 RX ORDER — LIDOCAINE HYDROCHLORIDE 20 MG/ML
INJECTION, SOLUTION INFILTRATION; PERINEURAL AS NEEDED
Status: DISCONTINUED | OUTPATIENT
Start: 2018-02-15 | End: 2018-02-15 | Stop reason: SURG

## 2018-02-15 RX ORDER — SODIUM CHLORIDE 9 MG/ML
500 INJECTION, SOLUTION INTRAVENOUS CONTINUOUS PRN
Status: DISCONTINUED | OUTPATIENT
Start: 2018-02-15 | End: 2018-02-15 | Stop reason: HOSPADM

## 2018-02-15 RX ORDER — AZELASTINE HYDROCHLORIDE 0.5 MG/ML
1 SOLUTION/ DROPS OPHTHALMIC 2 TIMES DAILY
COMMUNITY

## 2018-02-15 RX ORDER — LAMOTRIGINE 25 MG/1
25 TABLET ORAL DAILY
COMMUNITY
End: 2021-03-23

## 2018-02-15 RX ORDER — SODIUM CHLORIDE 0.9 % (FLUSH) 0.9 %
3 SYRINGE (ML) INJECTION AS NEEDED
Status: DISCONTINUED | OUTPATIENT
Start: 2018-02-15 | End: 2018-02-15 | Stop reason: HOSPADM

## 2018-02-15 RX ORDER — PROPOFOL 10 MG/ML
VIAL (ML) INTRAVENOUS AS NEEDED
Status: DISCONTINUED | OUTPATIENT
Start: 2018-02-15 | End: 2018-02-15 | Stop reason: SURG

## 2018-02-15 RX ADMIN — LIDOCAINE HYDROCHLORIDE 0.5 ML: 10 INJECTION, SOLUTION EPIDURAL; INFILTRATION; INTRACAUDAL; PERINEURAL at 09:17

## 2018-02-15 RX ADMIN — LIDOCAINE HYDROCHLORIDE 40 MG: 20 INJECTION, SOLUTION INFILTRATION; PERINEURAL at 10:24

## 2018-02-15 RX ADMIN — PROPOFOL 50 MG: 10 INJECTION, EMULSION INTRAVENOUS at 10:25

## 2018-02-15 RX ADMIN — SODIUM CHLORIDE 500 ML: 9 INJECTION, SOLUTION INTRAVENOUS at 09:17

## 2018-02-15 RX ADMIN — PROPOFOL 50 MG: 10 INJECTION, EMULSION INTRAVENOUS at 10:24

## 2018-02-15 RX ADMIN — PROPOFOL 50 MG: 10 INJECTION, EMULSION INTRAVENOUS at 10:26

## 2018-02-15 NOTE — PLAN OF CARE
Problem: Patient Care Overview (Adult)  Goal: Plan of Care Review  Outcome: Outcome(s) achieved Date Met: 02/15/18   02/15/18 1045   Patient Care Overview   Progress progress toward functional goals as expected   Outcome Evaluation   Outcome Summary/Follow up Plan d/c criteria met   Coping/Psychosocial Response Interventions   Plan Of Care Reviewed With patient;significant other

## 2018-02-15 NOTE — ANESTHESIA PREPROCEDURE EVALUATION
Anesthesia Evaluation     Patient summary reviewed   no history of anesthetic complications:  NPO Solid Status: > 8 hours  NPO Liquid Status: > 8 hours           Airway   Mallampati: I  TM distance: >3 FB  Neck ROM: full  no difficulty expected  Dental      Comment: Upper denture    Pulmonary    (+) a smoker Current Smoked day of surgery,   (-) COPD, asthma, sleep apnea  Cardiovascular   Exercise tolerance: excellent (>7 METS)    (+) valvular problems/murmurs murmur,   (-) hypertension      Neuro/Psych  (+) seizures (medication induced), psychiatric history Bipolar and Anxiety,     (-) TIA, CVA  GI/Hepatic/Renal/Endo    (-) liver disease, no renal disease, diabetes    Musculoskeletal     (+) neck pain,   Abdominal    Substance History      OB/GYN          Other                        Anesthesia Plan    ASA 2     general   total IV anesthesia  intravenous induction   Anesthetic plan and risks discussed with patient.

## 2018-02-15 NOTE — H&P (VIEW-ONLY)
Chief Complaint   Patient presents with   • Rectal Pain     having rectal pain lowwer abdominal pain       Subjective     HPI     Increasing burning, sharp rectal pain that has been occurring persistently for over one week.  Passing stool increases pain.  He has increased fiber intake to help soften stool to make it more comfortable to for stool to pass.  Pain will radiating into abdomen and cause cramping sensation  He is unable to tell if there is any bright red blood or melena stools present as he is color blind.  He denies consumption of caffeine.      CScope (Dr Robison) 5/2017  Tubular adenoma neg for dysplasia    Past Medical History:   Diagnosis Date   • Bipolar 1 disorder    • Chronic neck pain    • Heart murmur    • Panic attack        Past Surgical History:   Procedure Laterality Date   • ANKLE SURGERY      x2   • COLONOSCOPY N/A 5/18/2017    Procedure: COLONOSCOPY WITH ANESTHESIA;  Surgeon: Paresh Robison DO;  Location: [facility] ENDOSCOPY;  Service:    • NECK SURGERY      x2   • NOSE SURGERY     • RHINOPLASTY     • WRIST SURGERY Right        Outpatient Prescriptions Marked as Taking for the 2/13/18 encounter (Office Visit) with RIP Callahan   Medication Sig Dispense Refill   • clonazePAM (KlonoPIN) 1 MG tablet TK 1 T PO TID  0   • QUEtiapine (SEROquel) 300 MG tablet TK 2 TS PO HS  0       Allergies   Allergen Reactions   • Droperidol Other (See Comments)     Shuts his body down like having  A seizure       Social History     Social History   • Marital status: Single     Spouse name: N/A   • Number of children: N/A   • Years of education: N/A     Occupational History   • Not on file.     Social History Main Topics   • Smoking status: Former Smoker     Types: Cigarettes   • Smokeless tobacco: Current User     Types: Snuff   • Alcohol use No   • Drug use: No   • Sexual activity: Not on file     Other Topics Concern   • Not on file     Social History Narrative       Family History   Problem  "Relation Age of Onset   • Family history unknown: Yes       Review of Systems   Constitutional: Negative for fatigue, fever and unexpected weight change.   HENT: Negative for hearing loss, sore throat and voice change.    Eyes: Negative for visual disturbance.   Respiratory: Negative for cough, shortness of breath and wheezing.    Cardiovascular: Negative for chest pain and palpitations.   Gastrointestinal: Positive for abdominal pain and rectal pain. Negative for blood in stool and vomiting.   Endocrine: Negative for polydipsia and polyuria.   Genitourinary: Negative for difficulty urinating, dysuria, hematuria and urgency.   Musculoskeletal: Negative for joint swelling and myalgias.   Skin: Negative for color change, rash and wound.   Neurological: Negative for dizziness, tremors, seizures and syncope.   Hematological: Does not bruise/bleed easily.   Psychiatric/Behavioral: Negative for agitation and confusion. The patient is not nervous/anxious.        Objective     Vitals:    02/13/18 1329   BP: 128/78   Pulse: 90   Temp: 97 °F (36.1 °C)   SpO2: 98%   Weight: 81.2 kg (179 lb)   Height: 170.2 cm (67\")     Body mass index is 28.04 kg/(m^2).    Physical Exam   Constitutional: He is oriented to person, place, and time. He appears well-developed and well-nourished.   HENT:   Head: Normocephalic and atraumatic.   Eyes:   Pink, Nonicteric   Neck:   Global Assessment- supple. No JVD or lymphadenopathy   Cardiovascular: Normal rate, regular rhythm and normal heart sounds.  Exam reveals no gallop and no friction rub.    No murmur heard.  Pulmonary/Chest: Effort normal and breath sounds normal. No respiratory distress. He has no wheezes. He has no rales.   Inspection: Movements-Symmetrical   Abdominal: Soft. Bowel sounds are normal. He exhibits no distension and no mass. There is no tenderness. There is no rebound and no guarding.   Neurological: He is alert and oriented to person, place, and time.   General Exam-Deemed a " reliable historian, able to converse without difficulty and Able to move all extremities without difficulty       Imaging Results (most recent)     None          Body mass index is 28.04 kg/(m^2).    Assessment/Plan     Jessee was seen today for rectal pain.    Diagnoses and all orders for this visit:    Anal or rectal pain  -     Case Request; Standing  -     Implement Anesthesia Orders Day of Procedure; Standing  -     Obtain Informed Consent; Standing  -     Case Request        SIGMOIDOSCOPY (N/A)    Explained Miralax was option to loosen stool if needed  Epsom salt bath    Risk/benefit/options explained to pt regarding proceeding with sigmoidoscopy  There are no Patient Instructions on file for this visit.

## 2018-02-15 NOTE — ANESTHESIA POSTPROCEDURE EVALUATION
Patient: Jessee Kwok    Procedure Summary     Date Anesthesia Start Anesthesia Stop Room / Location    02/15/18 1021 1031  [facility] /  [facility]       Procedure Diagnosis Surgeon Provider    SIGMOIDOSCOPY (N/A ) Anal or rectal pain  (Anal or rectal pain [K62.89]) DO Palmer Hall CRNA          Anesthesia Type: general  Last vitals  BP   118/70 (02/15/18 1040)   Temp   96.9 °F (36.1 °C) (02/15/18 0854)   Pulse   85 (02/15/18 1040)   Resp   12 (02/15/18 1040)     SpO2   99 % (02/15/18 1040)     Post Anesthesia Care and Evaluation    Patient location during evaluation: PHASE II  Patient participation: complete - patient participated  Level of consciousness: awake  Pain score: 0  Pain management: adequate  Airway patency: patent  Anesthetic complications: No anesthetic complications  PONV Status: none  Cardiovascular status: acceptable  Respiratory status: acceptable  Hydration status: acceptable  No anesthesia care post op

## 2018-02-15 NOTE — PLAN OF CARE
Problem: Patient Care Overview (Adult)  Goal: Plan of Care Review  Outcome: Ongoing (interventions implemented as appropriate)   02/15/18 1029   Patient Care Overview   Progress improving   Outcome Evaluation   Outcome Summary/Follow up Plan no noted problem

## 2018-02-15 NOTE — PLAN OF CARE
Problem: GI Endoscopy (Adult)  Goal: Signs and Symptoms of Listed Potential Problems Will be Absent or Manageable (GI Endoscopy)  Outcome: Outcome(s) achieved Date Met: 02/15/18   02/15/18 1045   GI Endoscopy   Problems Assessed (GI Endoscopy) all   Problems Present (GI Endoscopy) none

## 2018-02-15 NOTE — PLAN OF CARE
Problem: Patient Care Overview (Adult)  Goal: Adult Individualization and Mutuality  Outcome: Ongoing (interventions implemented as appropriate)   02/15/18 0852   Individualization   Patient Specific Preferences none   Patient Specific Goals none   Patient Specific Interventions none   Mutuality/Individual Preferences   What Anxieties, Fears or Concerns Do You Have About Your Health or Care? none   What Questions Do You Have About Your Health or Care? none   What Information Would Help Us Give You More Personalized Care? none

## 2018-02-22 RX ORDER — CLONAZEPAM 1 MG/1
TABLET ORAL
Qty: 60 TABLET | Refills: 0 | Status: SHIPPED | OUTPATIENT
Start: 2018-02-22 | End: 2018-03-26 | Stop reason: SDUPTHER

## 2018-03-02 ENCOUNTER — TELEPHONE (OUTPATIENT)
Dept: PSYCHIATRY | Age: 38
End: 2018-03-02

## 2018-03-06 ENCOUNTER — TELEPHONE (OUTPATIENT)
Dept: PSYCHIATRY | Age: 38
End: 2018-03-06

## 2018-03-08 ENCOUNTER — OFFICE VISIT (OUTPATIENT)
Dept: PSYCHIATRY | Age: 38
End: 2018-03-08
Payer: MEDICAID

## 2018-03-08 VITALS
OXYGEN SATURATION: 100 % | DIASTOLIC BLOOD PRESSURE: 93 MMHG | BODY MASS INDEX: 28.25 KG/M2 | HEIGHT: 67 IN | WEIGHT: 180 LBS | SYSTOLIC BLOOD PRESSURE: 148 MMHG | HEART RATE: 78 BPM

## 2018-03-08 DIAGNOSIS — F31.9 BIPOLAR 1 DISORDER (HCC): Primary | ICD-10-CM

## 2018-03-08 PROCEDURE — 99215 OFFICE O/P EST HI 40 MIN: CPT | Performed by: NURSE PRACTITIONER

## 2018-03-08 RX ORDER — DIVALPROEX SODIUM 250 MG/1
TABLET, DELAYED RELEASE ORAL
Qty: 120 TABLET | Refills: 1 | Status: SHIPPED | OUTPATIENT
Start: 2018-03-08 | End: 2018-04-11 | Stop reason: SDUPTHER

## 2018-03-08 RX ORDER — QUETIAPINE 400 MG/1
400 TABLET, FILM COATED, EXTENDED RELEASE ORAL NIGHTLY
Qty: 30 TABLET | Refills: 3 | Status: SHIPPED | OUTPATIENT
Start: 2018-03-08 | End: 2018-03-28 | Stop reason: CLARIF

## 2018-03-28 RX ORDER — CLONAZEPAM 1 MG/1
TABLET ORAL
Qty: 60 TABLET | Refills: 0 | Status: SHIPPED | OUTPATIENT
Start: 2018-03-28 | End: 2018-04-30 | Stop reason: SDUPTHER

## 2018-03-28 RX ORDER — QUETIAPINE FUMARATE 400 MG/1
TABLET, FILM COATED ORAL
Qty: 30 TABLET | Refills: 3 | Status: SHIPPED | OUTPATIENT
Start: 2018-03-28 | End: 2018-04-11 | Stop reason: SDUPTHER

## 2018-03-29 NOTE — TELEPHONE ENCOUNTER
Called pt Klonopin into [facility] per ROBERT Valenzuela NP/ Dr. Jarred Novak. Left message on pharmacist vm. Pt notified.

## 2018-04-11 ENCOUNTER — OFFICE VISIT (OUTPATIENT)
Dept: PSYCHIATRY | Age: 38
End: 2018-04-11
Payer: MEDICAID

## 2018-04-11 VITALS
BODY MASS INDEX: 31.11 KG/M2 | HEIGHT: 67 IN | HEART RATE: 97 BPM | WEIGHT: 198.2 LBS | SYSTOLIC BLOOD PRESSURE: 132 MMHG | DIASTOLIC BLOOD PRESSURE: 85 MMHG | OXYGEN SATURATION: 96 %

## 2018-04-11 DIAGNOSIS — F41.9 ANXIETY: ICD-10-CM

## 2018-04-11 DIAGNOSIS — F31.9 BIPOLAR 1 DISORDER (HCC): Primary | ICD-10-CM

## 2018-04-11 PROCEDURE — 99215 OFFICE O/P EST HI 40 MIN: CPT | Performed by: NURSE PRACTITIONER

## 2018-04-11 RX ORDER — DIVALPROEX SODIUM 500 MG/1
TABLET, DELAYED RELEASE ORAL
Qty: 60 TABLET | Refills: 3 | Status: SHIPPED | OUTPATIENT
Start: 2018-04-11 | End: 2018-09-18

## 2018-04-11 RX ORDER — QUETIAPINE FUMARATE 400 MG/1
TABLET, FILM COATED ORAL
Qty: 60 TABLET | Refills: 3 | Status: SHIPPED | OUTPATIENT
Start: 2018-04-11 | End: 2018-10-29 | Stop reason: SDUPTHER

## 2018-04-11 RX ORDER — KETOCONAZOLE 20 MG/ML
2 SHAMPOO TOPICAL DAILY
Refills: 2 | COMMUNITY
Start: 2018-03-17 | End: 2019-09-04 | Stop reason: ALTCHOICE

## 2018-04-11 RX ORDER — TAMSULOSIN HYDROCHLORIDE 0.4 MG/1
0.4 CAPSULE ORAL DAILY
Refills: 5 | COMMUNITY
Start: 2018-03-07 | End: 2019-07-24 | Stop reason: ALTCHOICE

## 2018-05-01 RX ORDER — CLONAZEPAM 1 MG/1
TABLET ORAL
Qty: 60 TABLET | Refills: 0 | Status: SHIPPED | OUTPATIENT
Start: 2018-05-01 | End: 2018-09-18 | Stop reason: ALTCHOICE

## 2018-06-19 ENCOUNTER — OFFICE VISIT (OUTPATIENT)
Dept: PSYCHIATRY | Age: 38
End: 2018-06-19
Payer: COMMERCIAL

## 2018-06-19 VITALS
HEIGHT: 67 IN | HEART RATE: 74 BPM | SYSTOLIC BLOOD PRESSURE: 125 MMHG | BODY MASS INDEX: 29.87 KG/M2 | DIASTOLIC BLOOD PRESSURE: 82 MMHG | WEIGHT: 190.3 LBS | OXYGEN SATURATION: 97 %

## 2018-06-19 DIAGNOSIS — F31.9 BIPOLAR 1 DISORDER (HCC): Primary | ICD-10-CM

## 2018-06-19 DIAGNOSIS — F41.9 ANXIETY: ICD-10-CM

## 2018-06-19 PROCEDURE — 99214 OFFICE O/P EST MOD 30 MIN: CPT | Performed by: NURSE PRACTITIONER

## 2018-06-19 RX ORDER — MIRTAZAPINE 7.5 MG/1
7.5 TABLET, FILM COATED ORAL NIGHTLY
Qty: 30 TABLET | Refills: 3 | Status: SHIPPED | OUTPATIENT
Start: 2018-06-19 | End: 2018-09-18

## 2018-07-20 ENCOUNTER — HOSPITAL ENCOUNTER (EMERGENCY)
Age: 38
Discharge: HOME | End: 2018-07-21
Attending: EMERGENCY MEDICINE
Payer: COMMERCIAL

## 2018-07-20 DIAGNOSIS — F10.920 ACUTE ALCOHOLIC INTOXICATION WITHOUT COMPLICATION (HCC): ICD-10-CM

## 2018-07-20 DIAGNOSIS — F32.A DEPRESSION, UNSPECIFIED DEPRESSION TYPE: Primary | ICD-10-CM

## 2018-07-20 LAB
ACETAMINOPHEN LEVEL: <15 UG/ML
ALBUMIN SERPL-MCNC: 5 G/DL (ref 3.5–5.2)
ALP BLD-CCNC: 66 U/L (ref 40–130)
ALT SERPL-CCNC: 42 U/L (ref 5–41)
AMPHETAMINE SCREEN, URINE: NEGATIVE
ANION GAP SERPL CALCULATED.3IONS-SCNC: 15 MMOL/L (ref 7–19)
AST SERPL-CCNC: 28 U/L (ref 5–40)
BARBITURATE SCREEN URINE: NEGATIVE
BENZODIAZEPINE SCREEN, URINE: NEGATIVE
BILIRUB SERPL-MCNC: 0.4 MG/DL (ref 0.2–1.2)
BILIRUBIN URINE: NEGATIVE
BLOOD, URINE: NEGATIVE
BUN BLDV-MCNC: 11 MG/DL (ref 6–20)
CALCIUM SERPL-MCNC: 9.4 MG/DL (ref 8.6–10)
CANNABINOID SCREEN URINE: NEGATIVE
CHLORIDE BLD-SCNC: 106 MMOL/L (ref 98–111)
CLARITY: CLEAR
CO2: 23 MMOL/L (ref 22–29)
COCAINE METABOLITE SCREEN URINE: NEGATIVE
COLOR: YELLOW
CREAT SERPL-MCNC: 0.9 MG/DL (ref 0.5–1.2)
ETHANOL: 127 MG/DL (ref 0–0.08)
ETHANOL: 297 MG/DL (ref 0–0.08)
GFR NON-AFRICAN AMERICAN: >60
GLUCOSE BLD-MCNC: 111 MG/DL (ref 74–109)
GLUCOSE URINE: NEGATIVE MG/DL
HCT VFR BLD CALC: 44.3 % (ref 42–52)
HEMOGLOBIN: 15.1 G/DL (ref 14–18)
KETONES, URINE: NEGATIVE MG/DL
LEUKOCYTE ESTERASE, URINE: NEGATIVE
Lab: NORMAL
MCH RBC QN AUTO: 31.3 PG (ref 27–31)
MCHC RBC AUTO-ENTMCNC: 34.1 G/DL (ref 33–37)
MCV RBC AUTO: 91.9 FL (ref 80–94)
NITRITE, URINE: NEGATIVE
OPIATE SCREEN URINE: NEGATIVE
PDW BLD-RTO: 12.7 % (ref 11.5–14.5)
PH UA: 6
PLATELET # BLD: 349 K/UL (ref 130–400)
PMV BLD AUTO: 8.9 FL (ref 9.4–12.4)
POTASSIUM SERPL-SCNC: 3.8 MMOL/L (ref 3.5–5)
PROTEIN UA: NEGATIVE MG/DL
RBC # BLD: 4.82 M/UL (ref 4.7–6.1)
SALICYLATE, SERUM: <3 MG/DL (ref 3–10)
SODIUM BLD-SCNC: 144 MMOL/L (ref 136–145)
SPECIFIC GRAVITY UA: 1.01
TOTAL PROTEIN: 7.4 G/DL (ref 6.6–8.7)
URINE REFLEX TO CULTURE: NORMAL
UROBILINOGEN, URINE: 0.2 E.U./DL
VALPROIC ACID LEVEL: <2.8 UG/ML (ref 50–100)
WBC # BLD: 8.5 K/UL (ref 4.8–10.8)

## 2018-07-20 PROCEDURE — G0480 DRUG TEST DEF 1-7 CLASSES: HCPCS

## 2018-07-20 PROCEDURE — 85027 COMPLETE CBC AUTOMATED: CPT

## 2018-07-20 PROCEDURE — 6370000000 HC RX 637 (ALT 250 FOR IP): Performed by: EMERGENCY MEDICINE

## 2018-07-20 PROCEDURE — 81003 URINALYSIS AUTO W/O SCOPE: CPT

## 2018-07-20 PROCEDURE — 99284 EMERGENCY DEPT VISIT MOD MDM: CPT

## 2018-07-20 PROCEDURE — 80164 ASSAY DIPROPYLACETIC ACD TOT: CPT

## 2018-07-20 PROCEDURE — 80307 DRUG TEST PRSMV CHEM ANLYZR: CPT

## 2018-07-20 PROCEDURE — 96372 THER/PROPH/DIAG INJ SC/IM: CPT

## 2018-07-20 PROCEDURE — 6360000002 HC RX W HCPCS: Performed by: EMERGENCY MEDICINE

## 2018-07-20 PROCEDURE — 80053 COMPREHEN METABOLIC PANEL: CPT

## 2018-07-20 PROCEDURE — 36415 COLL VENOUS BLD VENIPUNCTURE: CPT

## 2018-07-20 RX ORDER — CLONAZEPAM 1 MG/1
2 TABLET ORAL ONCE
Status: COMPLETED | OUTPATIENT
Start: 2018-07-20 | End: 2018-07-20

## 2018-07-20 RX ORDER — ZIPRASIDONE MESYLATE 20 MG/ML
10 INJECTION, POWDER, LYOPHILIZED, FOR SOLUTION INTRAMUSCULAR ONCE
Status: COMPLETED | OUTPATIENT
Start: 2018-07-20 | End: 2018-07-20

## 2018-07-20 RX ORDER — THIAMINE MONONITRATE (VIT B1) 100 MG
100 TABLET ORAL ONCE
Status: COMPLETED | OUTPATIENT
Start: 2018-07-20 | End: 2018-07-20

## 2018-07-20 RX ADMIN — Medication 100 MG: at 22:41

## 2018-07-20 RX ADMIN — CLONAZEPAM 2 MG: 1 TABLET ORAL at 22:41

## 2018-07-20 RX ADMIN — ZIPRASIDONE MESYLATE 10 MG: 20 INJECTION, POWDER, LYOPHILIZED, FOR SOLUTION INTRAMUSCULAR at 13:12

## 2018-07-20 NOTE — ED PROVIDER NOTES
HYDROCODONE-ACETAMINOPHEN (NORCO) 5-325 MG PER TABLET    TK 1 T PO D TO BID PRN FOR SEVERE PAIN. MUST LAST 30 DAYS    KETOCONAZOLE (NIZORAL) 2 % SHAMPOO    Apply 2 mg topically daily    METOPROLOL TARTRATE (LOPRESSOR) 25 MG TABLET    TK 1 T PO BID    MIRTAZAPINE (REMERON) 7.5 MG TABLET    Take 1 tablet by mouth nightly    QUETIAPINE (SEROQUEL) 400 MG TABLET    Take 1.5 to 2 tablets by mouth nightly    RANITIDINE (ZANTAC) 150 MG TABLET    TK 1 T PO BID    TAMSULOSIN (FLOMAX) 0.4 MG CAPSULE    Take 0.4 mg by mouth daily       ALLERGIES     Droperidol    FAMILY HISTORY     History reviewed. No pertinent family history. SOCIAL HISTORY       Social History     Social History    Marital status: Single     Spouse name: N/A    Number of children: N/A    Years of education: N/A     Social History Main Topics    Smoking status: Former Smoker    Smokeless tobacco: Never Used    Alcohol use None    Drug use: Unknown    Sexual activity: Not Asked     Other Topics Concern    None     Social History Narrative    None       SCREENINGS             PHYSICAL EXAM    (up to 7 for level 4, 8 or more for level 5)     ED Triage Vitals [07/20/18 1243]   BP Temp Temp src Pulse Resp SpO2 Height Weight   (!) 156/95 98.3 °F (36.8 °C) -- 112 12 98 % -- --       Physical Exam   Constitutional: He is oriented to person, place, and time. He appears well-developed. No distress. Disheveled and intoxicated   HENT:   Head: Normocephalic and atraumatic. Eyes: Pupils are equal, round, and reactive to light. No scleral icterus. Neck: Normal range of motion. Neck supple. No JVD present. Cardiovascular: Normal rate, regular rhythm, normal heart sounds and intact distal pulses. Pulmonary/Chest: Effort normal and breath sounds normal. No respiratory distress. Abdominal: Soft. He exhibits no distension. There is no tenderness. Musculoskeletal: He exhibits no edema or tenderness.    Neurological: He is alert and oriented to person, place, and time. Skin: Skin is warm and dry. Psychiatric: His affect is labile. His speech is slurred. He is agitated. He exhibits a depressed mood. He expresses suicidal (vague, no plan) ideation. Vitals reviewed. DIAGNOSTIC RESULTS       LABS:  Labs Reviewed   CBC - Abnormal; Notable for the following:        Result Value    MCH 31.3 (*)     MPV 8.9 (*)     All other components within normal limits   COMPREHENSIVE METABOLIC PANEL - Abnormal; Notable for the following:     Glucose 111 (*)     ALT 42 (*)     All other components within normal limits   VALPROIC ACID LEVEL, TOTAL - Abnormal; Notable for the following:     Valproic Acid Lvl <2.8 (*)     All other components within normal limits   ACETAMINOPHEN LEVEL   ETHANOL   SALICYLATE LEVEL   URINE DRUG SCREEN   URINE RT REFLEX TO CULTURE   ETHANOL       All other labs were within normal range or not returned as of this dictation. EMERGENCY DEPARTMENT COURSE and DIFFERENTIAL DIAGNOSIS/MDM:   Vitals:    Vitals:    07/20/18 1243   BP: (!) 156/95   Pulse: 112   Resp: 12   Temp: 98.3 °F (36.8 °C)   SpO2: 98%       MDM  Case discussed with Dr. Dennie Hockey who will be assuming care. CONSULTS:  None    PROCEDURES:  Unless otherwise noted below, none     Procedures    FINAL IMPRESSION      1. Depression, unspecified depression type    2. Acute alcoholic intoxication without complication (HCC)          DISPOSITION/PLAN   DISPOSITION        PATIENT REFERRED TO:  No follow-up provider specified.     DISCHARGE MEDICATIONS:  New Prescriptions    No medications on file          (Please note that portions of this note were completed with a voice recognition program.  Efforts were made to edit the dictations but occasionally words are mis-transcribed.)    Meka Weldon MD (electronically signed)  Attending Emergency Physician        Meka Weldon MD  07/20/18 2502

## 2018-07-20 NOTE — ED NOTES
Pt placed in paper scrubs. Personal belongs removed and given to security. Suicidal Flowsheet Initiated. Sitter at bedside.       Nghia Kc RN  07/20/18 7330

## 2018-07-21 VITALS
DIASTOLIC BLOOD PRESSURE: 86 MMHG | RESPIRATION RATE: 18 BRPM | TEMPERATURE: 98.3 F | SYSTOLIC BLOOD PRESSURE: 148 MMHG | HEART RATE: 88 BPM | OXYGEN SATURATION: 98 %

## 2018-07-21 LAB — ETHANOL: 71 MG/DL (ref 0–0.08)

## 2018-07-21 PROCEDURE — 36415 COLL VENOUS BLD VENIPUNCTURE: CPT

## 2018-07-21 PROCEDURE — 99285 EMERGENCY DEPT VISIT HI MDM: CPT | Performed by: EMERGENCY MEDICINE

## 2018-07-21 PROCEDURE — G0480 DRUG TEST DEF 1-7 CLASSES: HCPCS

## 2018-07-21 ASSESSMENT — LIFESTYLE VARIABLES: HISTORY_ALCOHOL_USE: YES

## 2018-07-21 NOTE — ED PROVIDER NOTES
[facility]  eMERGENCY dEPARTMENT eNCOUnter      Pt Name: Layla Collins  MRN: [id number]  [place] 1980  Date of evaluation: 7/20/2018  Provider: Rafael Berrios MD    CHIEF COMPLAINT       Chief Complaint   Patient presents with    Alcohol Intoxication     arrived via EMS from [address] [place] [address] with ETOH on board (unable to walk without falling and bellergient)         PHYSICAL EXAM    (up to 7 for level 4, 8 or more for level 5)     ED Triage Vitals [07/20/18 1243]   BP Temp Temp src Pulse Resp SpO2 Height Weight   (!) 156/95 98.3 °F (36.8 °C) -- 112 12 98 % -- --       Physical Exam    DIAGNOSTIC RESULTS     EKG: All EKG's are interpreted by the Emergency Department Physician who either signs or Co-signs this chart in the absence of a cardiologist.        RADIOLOGY:   Non-plain film images such as CT, Ultrasound and MRI are read by the radiologist. Plain radiographic images are visualized and preliminarily interpreted by the emergency physician with the below findings:          No orders to display           LABS:  Labs Reviewed   CBC - Abnormal; Notable for the following:        Result Value    MCH 31.3 (*)     MPV 8.9 (*)     All other components within normal limits   COMPREHENSIVE METABOLIC PANEL - Abnormal; Notable for the following:     Glucose 111 (*)     ALT 42 (*)     All other components within normal limits   VALPROIC ACID LEVEL, TOTAL - Abnormal; Notable for the following:     Valproic Acid Lvl <2.8 (*)     All other components within normal limits   ACETAMINOPHEN LEVEL   ETHANOL   SALICYLATE LEVEL   URINE DRUG SCREEN   URINE RT REFLEX TO CULTURE   ETHANOL   ETHANOL       All other labs were within normal range or not returned as of this dictation.     EMERGENCY DEPARTMENT COURSE and DIFFERENTIAL DIAGNOSIS/MDM:   Vitals:    Vitals:    07/20/18 1243 07/20/18 1931   BP: (!) 156/95 (!) 148/88   Pulse: 112 98   Resp: 12 14   Temp: 98.3 °F (36.8 °C)    SpO2: 98% 99%       MDM  Number of Diagnoses or Management Options  Acute alcoholic intoxication without complication ([facility] [address]):   Depression, unspecified depression type:   Diagnosis management comments: 9:05 PM. Patient is sobering up. Wants to leave. Because he called 911 his recent grief. Alcohol use and his behavior here in the ED I feel its best that he sober up enough to be evaluated and cleared by psychiatry. Which if they feel he is stable for discharge L be happy to release him. The patient may be placed on a hold until this can be accomplished. 11 PM. Repeat alcohol level CXXVII. Still too high for site, evaluate. I administered the patient's home meds Klonopin he had not had today. 2 mg by mouth. Should help with withdrawal symptoms as well. Patient's otherwise been cooperative. He will be now 1:30 AM for repeat alcohol level is obtained. Hopefully then will be within a evaluative  Range. 12 PM my shift has ended. Patient's care will be transferred to the nighttime attending Dr. Aysha Veras. Awaiting repeat alcohol level and psychiatric clearance. Reassessment  I assumed care at 3 PM. Dr. Karl Husain had evaluated the patient he came in intoxicated and belligerent behavior. Received Geodon for treatment of his agitation. Waiting for repeat alcohol and psychiatric clearance. Will repeat alcohol at 10 PM.    CONSULTS:  None    PROCEDURES:  Unless otherwise noted below, none     Procedures    FINAL IMPRESSION      1. Depression, unspecified depression type    2. Acute alcoholic intoxication without complication (HCC)          DISPOSITION/PLAN   DISPOSITION     PATIENT REFERRED TO:  No follow-up provider specified.     DISCHARGE MEDICATIONS:  New Prescriptions    No medications on file          (Please note that portions of this note were completed with a voice recognition program.  Efforts were made to edit the dictations but occasionally words are mis-transcribed.)    Mitra Avelar MD (electronically signed)  Attending Emergency Physician

## 2018-07-21 NOTE — ED NOTES
Explained to pt that we will be redrawing his alcohol level at around 10 tonight and that we would then have [facility] evaluate him if we are able. Pt agrees calmly to process. Pt calm and cooperative at this time.       Erlin Trevino RN  07/20/18 9686

## 2018-07-21 NOTE — ED NOTES
Pt ambulated to bathroom without difficulty at this time.       Herlinda Oppenheim, RN  07/20/18 2014

## 2018-07-21 NOTE — BH NOTE
disposition:     1. Decision to admit to :no        2. Referral to IOP/PHP: Pt is already a pt at [address]      3.  Decision to Discharge:   Does not meet criteria for acceptance to   unit due to: NoSI no HI no AVH and is not delusional        Other follow up information provided:  Pt's room mate of last four years states he has never known the pt to be suicidal     ANASTACIA Leon RN

## 2018-09-18 ENCOUNTER — OFFICE VISIT (OUTPATIENT)
Dept: PSYCHIATRY | Age: 38
End: 2018-09-18
Payer: COMMERCIAL

## 2018-09-18 VITALS
OXYGEN SATURATION: 96 % | HEIGHT: 67 IN | SYSTOLIC BLOOD PRESSURE: 156 MMHG | HEART RATE: 111 BPM | BODY MASS INDEX: 29.82 KG/M2 | WEIGHT: 190 LBS | DIASTOLIC BLOOD PRESSURE: 109 MMHG

## 2018-09-18 DIAGNOSIS — F31.9 BIPOLAR 1 DISORDER (HCC): Primary | ICD-10-CM

## 2018-09-18 DIAGNOSIS — F41.9 ANXIETY: ICD-10-CM

## 2018-09-18 PROCEDURE — 99215 OFFICE O/P EST HI 40 MIN: CPT | Performed by: NURSE PRACTITIONER

## 2018-09-18 RX ORDER — RISPERIDONE 0.5 MG/1
0.5 TABLET, FILM COATED ORAL 2 TIMES DAILY
Qty: 60 TABLET | Refills: 3 | Status: SHIPPED | OUTPATIENT
Start: 2018-09-18 | End: 2018-12-18 | Stop reason: ALTCHOICE

## 2018-09-18 RX ORDER — LORAZEPAM 1 MG/1
1 TABLET ORAL DAILY
Qty: 30 TABLET | Refills: 0 | Status: SHIPPED | OUTPATIENT
Start: 2018-09-18 | End: 2018-10-18

## 2018-09-18 NOTE — PROGRESS NOTES
Called pt Ativan into [facility] per ROBERT Chaney NP/ Dr. William Garvey. Left message on pharmacist vm.

## 2018-09-19 ENCOUNTER — TELEPHONE (OUTPATIENT)
Dept: PSYCHIATRY | Age: 38
End: 2018-09-19

## 2018-10-19 DIAGNOSIS — F41.9 ANXIETY: Primary | ICD-10-CM

## 2018-10-19 RX ORDER — LORAZEPAM 1 MG/1
TABLET ORAL
Qty: 30 TABLET | Refills: 0 | Status: SHIPPED | OUTPATIENT
Start: 2018-10-19 | End: 2018-11-06

## 2018-10-19 NOTE — TELEPHONE ENCOUNTER
Called Rx into pharmacy. Left on vm.
mouth 2 times daily 9/18/18   ALO Vega   tamsulosin [facility]) 0.4 MG capsule Take 0.4 mg by mouth daily 3/7/18   Historical Provider, MD   ketoconazole (NIZORAL) 2 % shampoo Apply 2 mg topically daily 3/17/18   Historical Provider, MD   QUEtiapine (SEROQUEL) 400 MG tablet Take 1.5 to 2 tablets by mouth nightly 4/11/18   ALO Vega   metoprolol tartrate (LOPRESSOR) 25 MG tablet TK 1 T PO BID 10/18/17   Historical Provider, MD   ranitidine (ZANTAC) 150 MG tablet TK 1 T PO BID 10/11/17   Historical Provider, MD   HYDROcodone-acetaminophen (NORCO) 5-325 MG per tablet TK 1 T PO D TO BID PRN FOR SEVERE PAIN. MUST LAST 30 DAYS 9/11/17   Historical Provider, MD       MSE:  Patient is  A & O x3. Appearance:  street clothes and in chair appropriately dressed for season and age. Cognition:  Recent memory intact , remote memory intact , good fund of knowledge, average  intelligence level. Speech:  normal  Language: Naming: intact; Word Finding: intact  Conversation no evidence of delusions  Behavior:  Cooperative and Good eye contact  Mood: anxious  Affect: congruent with mood  Thought Content: no evidence of overt psychosis, delusional thought or suicidal /homicidal ideation or plan  Thought Process: linear, goal directed and coherent  Judgement Insight:  normal and appropriate  Gait and Station:normal gait and station and normal balance   Musculoskeletal: WNL      Assesment:   No diagnosis found. Plan:  Start Risperdal 0.5 mg PO BID  Discontinue clonazepam 1 mg PO BID  Start lorazepam 1 mg PO qd  Continue Seroquel 400 mg PO BID  Continue Depakote 500 mg PO BID  1. The risks, benefits, side effects, indications, contraindications, and adverse effects of the medications have been discussed. Yes.  2. The pt has verbalized understanding and has capacity to give informed consent. 3. The [facility] Dorita report has been reviewed according to [facility] regulations. 4. Supportive therapy offered.   5. Follow

## 2018-10-29 RX ORDER — QUETIAPINE FUMARATE 400 MG/1
TABLET, FILM COATED ORAL
Qty: 60 TABLET | Refills: 0 | Status: SHIPPED | OUTPATIENT
Start: 2018-10-29 | End: 2018-12-07 | Stop reason: SDUPTHER

## 2018-11-02 NOTE — TELEPHONE ENCOUNTER
Pt called office and stated that the Ativan is not working for him and he states for it to work he would have to take more than he is supposed to. Pt wants to know where to go from here. Let pt know ROBERT Hayden NP is out of the office until Tuesday. Pt verbalized understanding. Advised pt if he needs immediate attention to go the ER.

## 2018-11-06 DIAGNOSIS — F41.9 ANXIETY: Primary | ICD-10-CM

## 2018-11-06 RX ORDER — CLONAZEPAM 1 MG/1
1 TABLET ORAL 2 TIMES DAILY PRN
Qty: 60 TABLET | Refills: 0 | Status: SHIPPED | OUTPATIENT
Start: 2018-11-06 | End: 2018-12-11 | Stop reason: SDUPTHER

## 2018-12-07 RX ORDER — QUETIAPINE FUMARATE 400 MG/1
TABLET, FILM COATED ORAL
Qty: 60 TABLET | Refills: 0 | Status: SHIPPED | OUTPATIENT
Start: 2018-12-07 | End: 2018-12-18 | Stop reason: SDUPTHER

## 2018-12-07 NOTE — TELEPHONE ENCOUNTER
has verbalized understanding and has capacity to give informed consent. 3. The Frandy Duncan report has been reviewed according to [facility] regulations. 4. Supportive therapy offered. 5. Follow up: No Follow-up on file. 6. The patient has been advised to call with any problems. 7. Controlled substance Treatment Plan: this is a tapering dose. .  8. The above listed medications have been continued, modifications in meds and other orders/labs as follows: No orders of the defined types were placed in this encounter. No orders of the defined types were placed in this encounter.       9. Additional comments:      Dawson Conley, PAULP-BC

## 2018-12-11 DIAGNOSIS — F41.9 ANXIETY: ICD-10-CM

## 2018-12-11 RX ORDER — CLONAZEPAM 1 MG/1
1 TABLET ORAL 2 TIMES DAILY PRN
Qty: 60 TABLET | Refills: 0 | Status: SHIPPED | OUTPATIENT
Start: 2018-12-11 | End: 2019-01-21 | Stop reason: SDUPTHER

## 2018-12-18 ENCOUNTER — OFFICE VISIT (OUTPATIENT)
Dept: PSYCHIATRY | Age: 38
End: 2018-12-18
Payer: MEDICAID

## 2018-12-18 VITALS
OXYGEN SATURATION: 95 % | WEIGHT: 180 LBS | BODY MASS INDEX: 27.28 KG/M2 | SYSTOLIC BLOOD PRESSURE: 137 MMHG | HEART RATE: 102 BPM | HEIGHT: 68 IN | DIASTOLIC BLOOD PRESSURE: 86 MMHG

## 2018-12-18 DIAGNOSIS — F31.9 BIPOLAR 1 DISORDER (HCC): ICD-10-CM

## 2018-12-18 DIAGNOSIS — F31.9 BIPOLAR 1 DISORDER (HCC): Primary | ICD-10-CM

## 2018-12-18 DIAGNOSIS — F10.10 ALCOHOL ABUSE: ICD-10-CM

## 2018-12-18 DIAGNOSIS — F41.9 ANXIETY: ICD-10-CM

## 2018-12-18 LAB
ALBUMIN SERPL-MCNC: 4.5 G/DL (ref 3.5–5.2)
ALP BLD-CCNC: 75 U/L (ref 40–130)
ALT SERPL-CCNC: 21 U/L (ref 5–41)
ANION GAP SERPL CALCULATED.3IONS-SCNC: 16 MMOL/L (ref 7–19)
AST SERPL-CCNC: 22 U/L (ref 5–40)
BASOPHILS ABSOLUTE: 0 K/UL (ref 0–0.2)
BASOPHILS RELATIVE PERCENT: 0.7 % (ref 0–1)
BILIRUB SERPL-MCNC: 0.4 MG/DL (ref 0.2–1.2)
BUN BLDV-MCNC: 10 MG/DL (ref 6–20)
CALCIUM SERPL-MCNC: 9.5 MG/DL (ref 8.6–10)
CHLORIDE BLD-SCNC: 99 MMOL/L (ref 98–111)
CO2: 25 MMOL/L (ref 22–29)
CREAT SERPL-MCNC: 0.8 MG/DL (ref 0.5–1.2)
EOSINOPHILS ABSOLUTE: 0.2 K/UL (ref 0–0.6)
EOSINOPHILS RELATIVE PERCENT: 2.8 % (ref 0–5)
GFR NON-AFRICAN AMERICAN: >60
GLUCOSE BLD-MCNC: 100 MG/DL (ref 74–109)
HCT VFR BLD CALC: 43 % (ref 42–52)
HEMOGLOBIN: 14.3 G/DL (ref 14–18)
LYMPHOCYTES ABSOLUTE: 2.7 K/UL (ref 1.1–4.5)
LYMPHOCYTES RELATIVE PERCENT: 45.9 % (ref 20–40)
MCH RBC QN AUTO: 31.1 PG (ref 27–31)
MCHC RBC AUTO-ENTMCNC: 33.3 G/DL (ref 33–37)
MCV RBC AUTO: 93.5 FL (ref 80–94)
MONOCYTES ABSOLUTE: 0.4 K/UL (ref 0–0.9)
MONOCYTES RELATIVE PERCENT: 7.2 % (ref 0–10)
NEUTROPHILS ABSOLUTE: 2.5 K/UL (ref 1.5–7.5)
NEUTROPHILS RELATIVE PERCENT: 43.2 % (ref 50–65)
PDW BLD-RTO: 12.6 % (ref 11.5–14.5)
PLATELET # BLD: 350 K/UL (ref 130–400)
PMV BLD AUTO: 9.3 FL (ref 9.4–12.4)
POTASSIUM SERPL-SCNC: 4 MMOL/L (ref 3.5–5)
RBC # BLD: 4.6 M/UL (ref 4.7–6.1)
SODIUM BLD-SCNC: 140 MMOL/L (ref 136–145)
TOTAL PROTEIN: 7.3 G/DL (ref 6.6–8.7)
TSH SERPL DL<=0.05 MIU/L-ACNC: 2.77 UIU/ML (ref 0.27–4.2)
WBC # BLD: 5.8 K/UL (ref 4.8–10.8)

## 2018-12-18 PROCEDURE — 99214 OFFICE O/P EST MOD 30 MIN: CPT | Performed by: NURSE PRACTITIONER

## 2018-12-18 RX ORDER — QUETIAPINE FUMARATE 400 MG/1
400 TABLET, FILM COATED ORAL 2 TIMES DAILY
Qty: 60 TABLET | Refills: 0 | Status: SHIPPED | OUTPATIENT
Start: 2018-12-18 | End: 2019-02-11 | Stop reason: SDUPTHER

## 2019-01-21 DIAGNOSIS — F41.9 ANXIETY: ICD-10-CM

## 2019-01-22 RX ORDER — CLONAZEPAM 1 MG/1
TABLET ORAL
Qty: 60 TABLET | Refills: 0 | Status: SHIPPED | OUTPATIENT
Start: 2019-01-22 | End: 2019-03-01 | Stop reason: SDUPTHER

## 2019-02-12 RX ORDER — QUETIAPINE FUMARATE 400 MG/1
400 TABLET, FILM COATED ORAL 2 TIMES DAILY
Qty: 60 TABLET | Refills: 0 | Status: SHIPPED | OUTPATIENT
Start: 2019-02-12 | End: 2019-03-15 | Stop reason: SDUPTHER

## 2019-03-01 DIAGNOSIS — F41.9 ANXIETY: ICD-10-CM

## 2019-03-01 RX ORDER — CLONAZEPAM 1 MG/1
TABLET ORAL
Qty: 60 TABLET | Refills: 0 | Status: SHIPPED | OUTPATIENT
Start: 2019-03-01 | End: 2019-04-12 | Stop reason: SDUPTHER

## 2019-03-15 ENCOUNTER — OFFICE VISIT (OUTPATIENT)
Dept: PSYCHIATRY | Age: 39
End: 2019-03-15
Payer: COMMERCIAL

## 2019-03-15 VITALS
HEIGHT: 67 IN | DIASTOLIC BLOOD PRESSURE: 87 MMHG | OXYGEN SATURATION: 97 % | BODY MASS INDEX: 30.13 KG/M2 | WEIGHT: 192 LBS | SYSTOLIC BLOOD PRESSURE: 121 MMHG | HEART RATE: 78 BPM

## 2019-03-15 DIAGNOSIS — F10.10 ALCOHOL ABUSE: ICD-10-CM

## 2019-03-15 DIAGNOSIS — F31.9 BIPOLAR 1 DISORDER (HCC): ICD-10-CM

## 2019-03-15 DIAGNOSIS — F41.9 ANXIETY: Primary | ICD-10-CM

## 2019-03-15 DIAGNOSIS — Z63.8 STRESS DUE TO FAMILY TENSION: ICD-10-CM

## 2019-03-15 PROCEDURE — 99214 OFFICE O/P EST MOD 30 MIN: CPT | Performed by: NURSE PRACTITIONER

## 2019-03-15 RX ORDER — QUETIAPINE FUMARATE 400 MG/1
400 TABLET, FILM COATED ORAL 2 TIMES DAILY
Qty: 60 TABLET | Refills: 3 | Status: SHIPPED | OUTPATIENT
Start: 2019-03-15 | End: 2019-07-24

## 2019-03-15 SDOH — SOCIAL STABILITY - SOCIAL INSECURITY: OTHER SPECIFIED PROBLEMS RELATED TO PRIMARY SUPPORT GROUP: Z63.8

## 2019-04-12 DIAGNOSIS — F41.9 ANXIETY: ICD-10-CM

## 2019-04-12 RX ORDER — CLONAZEPAM 1 MG/1
TABLET ORAL
Qty: 60 TABLET | Refills: 0 | Status: SHIPPED | OUTPATIENT
Start: 2019-04-12 | End: 2019-05-28 | Stop reason: SDUPTHER

## 2019-04-12 NOTE — TELEPHONE ENCOUNTER
(SEROQUEL) 400 MG tablet Take 1 tablet by mouth 2 times daily 3/15/19   ALO Ybarra   clonazePAM (KLONOPIN) 1 MG tablet TAKE 1 TABLET BY MOUTH TWICE DAILY AS NEEDED FOR ANXIETY. 3/1/19 3/31/19  ALO Ybarra   tamsulosin (FLOMAX) 0.4 MG capsule Take 0.4 mg by mouth daily 3/7/18   Historical Provider, MD   ketoconazole (NIZORAL) 2 % shampoo Apply 2 mg topically daily 3/17/18   Historical Provider, MD   metoprolol tartrate (LOPRESSOR) 25 MG tablet TK 1 T PO BID 10/18/17   Historical Provider, MD   ranitidine (ZANTAC) 150 MG tablet TK 1 T PO BID 10/11/17   Historical Provider, MD   HYDROcodone-acetaminophen (NORCO) 5-325 MG per tablet TK 1 T PO D TO BID PRN FOR SEVERE PAIN. MUST LAST 30 DAYS 9/11/17   Historical Provider, MD      Lab Review   No visits with results within 2 Month(s) from this visit.    Latest known visit with results is:   Orders Only on 12/18/2018   Component Date Value    WBC 12/18/2018 5.8     RBC 12/18/2018 4.60*    Hemoglobin 12/18/2018 14.3     Hematocrit 12/18/2018 43.0     MCV 12/18/2018 93.5     MCH 12/18/2018 31.1*    MCHC 12/18/2018 33.3     RDW 12/18/2018 12.6     Platelets 89/51/4855 350     MPV 12/18/2018 9.3*    Neutrophils % 12/18/2018 43.2*    Lymphocytes % 12/18/2018 45.9*    Monocytes % 12/18/2018 7.2     Eosinophils % 12/18/2018 2.8     Basophils % 12/18/2018 0.7     Neutrophils # 12/18/2018 2.5     Lymphocytes # 12/18/2018 2.7     Monocytes # 12/18/2018 0.40     Eosinophils # 12/18/2018 0.20     Basophils # 12/18/2018 0.00     Sodium 12/18/2018 140     Potassium 12/18/2018 4.0     Chloride 12/18/2018 99     CO2 12/18/2018 25     Anion Gap 12/18/2018 16     Glucose 12/18/2018 100     BUN 12/18/2018 10     CREATININE 12/18/2018 0.8     GFR Non- 12/18/2018 >60     Calcium 12/18/2018 9.5     Total Protein 12/18/2018 7.3     Alb 12/18/2018 4.5     Total Bilirubin 12/18/2018 0.4     Alkaline Phosphatase 12/18/2018 75     ALT 12/18/2018 21     AST 12/18/2018 22     TSH 12/18/2018 2.770          MSE:  Patient is  A & O x3. Appearance:  Neck tattoos, street clothes, in chair, good grooming and good hygiene appropriately dressed for season and age. Cognition:  Recent memory intact , remote memory intact , good fund of knowledge, average  intelligence level. Speech:  normal  Language: Naming: intact; Word Finding: intact  Conversation no evidence of delusions  Behavior:  Cooperative and Good eye contact  Mood: anxious  Affect: congruent with mood  Thought Content: no evidence of overt psychosis, delusional thought or suicidal /homicidal ideation or plan  Thought Process: goal directed and coherent and associations appropriate  Concentration/ attention span: variable  Judgement Insight:  fair and fair  Gait and Station:normal gait and station and normal balance   Musculoskeletal: WNL      Assesment:   1. Anxiety        Plan:  Continue medications as prescribed. 1. The risks, benefits, side effects, indications, contraindications, and adverse effects of the medications have been discussed. Yes.  2. The pt has verbalized understanding and has capacity to give informed consent. 3. The [facility] report has been reviewed according to [facility] regulations. 4. Supportive therapy offered. 5. Follow up: No follow-ups on file. 6. The patient has been advised to call with any problems. 7. Controlled substance Treatment Plan: this is a maintenance dose. .  8. The above listed medications have been continued, modifications in meds and other orders/labs as follows: No orders of the defined types were placed in this encounter. No orders of the defined types were placed in this encounter. 9. Additional comments:    Over 50% of the total visit time was spent on counseling and/or coordination of care.     Sonido Garcia, OLLIE-BC

## 2019-04-29 ENCOUNTER — TELEPHONE (OUTPATIENT)
Dept: PSYCHIATRY | Age: 39
End: 2019-04-29

## 2019-04-29 NOTE — TELEPHONE ENCOUNTER
Pt called and stated he is needing to speak with Otoniel Oliveira NP. Let pt know she is out of the office on Mondays but will return tomorrow. Pt is wanting to speak with her as he has had some tragedies happen. When asked pt what is going on so can give ROBERT Marie NP background as to what is going all pt would say it is hard to explain. Let pt know I would send this to [facility] and if his symptoms worsen to report to ER. Pt verbalized understanding.

## 2019-05-01 NOTE — TELEPHONE ENCOUNTER
I appreciate Raudel Plasencia reaching out to me to let me know what's going on. He could send it in an email to me : [email or web address] or wait until the next ov to catch up. Thanks.

## 2019-05-28 DIAGNOSIS — F41.9 ANXIETY: ICD-10-CM

## 2019-05-28 DIAGNOSIS — Z79.899 HIGH RISK MEDICATION USE: Primary | ICD-10-CM

## 2019-05-28 RX ORDER — CLONAZEPAM 0.5 MG/1
TABLET ORAL
Qty: 60 TABLET | Refills: 0 | Status: SHIPPED | OUTPATIENT
Start: 2019-05-28 | End: 2019-06-25 | Stop reason: SDUPTHER

## 2019-05-28 NOTE — TELEPHONE ENCOUNTER
(KLONOPIN) 1 MG tablet TAKE 1 TABLET BY MOUTH TWICE DAILY AS NEEDED FOR ANXIETY 4/12/19 5/12/19  ALO Coley   QUEtiapine (SEROQUEL) 400 MG tablet Take 1 tablet by mouth 2 times daily 3/15/19   ALO Coley   tamsulosin [facility]) 0.4 MG capsule Take 0.4 mg by mouth daily 3/7/18   Historical Provider, MD   ketoconazole (NIZORAL) 2 % shampoo Apply 2 mg topically daily 3/17/18   Historical Provider, MD   metoprolol tartrate (LOPRESSOR) 25 MG tablet TK 1 T PO BID 10/18/17   Historical Provider, MD   ranitidine (ZANTAC) 150 MG tablet TK 1 T PO BID 10/11/17   Historical Provider, MD   HYDROcodone-acetaminophen (NORCO) 5-325 MG per tablet TK 1 T PO D TO BID PRN FOR SEVERE PAIN. MUST LAST 30 DAYS 9/11/17   Historical Provider, MD      Lab Review   No visits with results within 2 Month(s) from this visit.    Latest known visit with results is:   Orders Only on 12/18/2018   Component Date Value    WBC 12/18/2018 5.8     RBC 12/18/2018 4.60*    Hemoglobin 12/18/2018 14.3     Hematocrit 12/18/2018 43.0     MCV 12/18/2018 93.5     MCH 12/18/2018 31.1*    MCHC 12/18/2018 33.3     RDW 12/18/2018 12.6     Platelets 57/52/4575 350     MPV 12/18/2018 9.3*    Neutrophils % 12/18/2018 43.2*    Lymphocytes % 12/18/2018 45.9*    Monocytes % 12/18/2018 7.2     Eosinophils % 12/18/2018 2.8     Basophils % 12/18/2018 0.7     Neutrophils # 12/18/2018 2.5     Lymphocytes # 12/18/2018 2.7     Monocytes # 12/18/2018 0.40     Eosinophils # 12/18/2018 0.20     Basophils # 12/18/2018 0.00     Sodium 12/18/2018 140     Potassium 12/18/2018 4.0     Chloride 12/18/2018 99     CO2 12/18/2018 25     Anion Gap 12/18/2018 16     Glucose 12/18/2018 100     BUN 12/18/2018 10     CREATININE 12/18/2018 0.8     GFR Non- 12/18/2018 >60     Calcium 12/18/2018 9.5     Total Protein 12/18/2018 7.3     Alb 12/18/2018 4.5     Total Bilirubin 12/18/2018 0.4     Alkaline Phosphatase 12/18/2018 75     ALT 12/18/2018 21     AST 12/18/2018 22     TSH 12/18/2018 2.770          MSE:  Patient is  A & O x3. Appearance:  Neck tattoos, street clothes, in chair, good grooming and good hygiene appropriately dressed for season and age. Cognition:  Recent memory intact , remote memory intact , good fund of knowledge, average  intelligence level. Speech:  normal  Language: Naming: intact; Word Finding: intact  Conversation no evidence of delusions  Behavior:  Cooperative and Good eye contact  Mood: anxious  Affect: congruent with mood  Thought Content: no evidence of overt psychosis, delusional thought or suicidal /homicidal ideation or plan  Thought Process: goal directed and coherent and associations appropriate  Concentration/ attention span: variable  Judgement Insight:  fair and fair  Gait and Station:normal gait and station and normal balance   Musculoskeletal: WNL      Assesment:   1. Anxiety        Plan:  Continue medications as prescribed. 1. The risks, benefits, side effects, indications, contraindications, and adverse effects of the medications have been discussed. Yes.  2. The pt has verbalized understanding and has capacity to give informed consent. 3. The [facility] report has been reviewed according to [facility] regulations. 4. Supportive therapy offered. 5. Follow up: No follow-ups on file. 6. The patient has been advised to call with any problems. 7. Controlled substance Treatment Plan: this is a maintenance dose. .  8. The above listed medications have been continued, modifications in meds and other orders/labs as follows: No orders of the defined types were placed in this encounter. No orders of the defined types were placed in this encounter. 9. Additional comments:    Over 50% of the total visit time was spent on counseling and/or coordination of care.     Malinda Pedraza, OLLIE-BC

## 2019-05-28 NOTE — TELEPHONE ENCOUNTER
Decreased the strength of the pill from 1mg to 0.5mg. Will need to come in for a UDS prior to the next refill.

## 2019-06-01 ENCOUNTER — HOSPITAL ENCOUNTER (EMERGENCY)
Age: 39
Discharge: HOME | End: 2019-06-01
Attending: FAMILY MEDICINE
Payer: COMMERCIAL

## 2019-06-01 VITALS
TEMPERATURE: 97.7 F | WEIGHT: 200 LBS | HEIGHT: 67 IN | SYSTOLIC BLOOD PRESSURE: 111 MMHG | HEART RATE: 101 BPM | BODY MASS INDEX: 31.39 KG/M2 | OXYGEN SATURATION: 92 % | DIASTOLIC BLOOD PRESSURE: 67 MMHG | RESPIRATION RATE: 16 BRPM

## 2019-06-01 DIAGNOSIS — F10.920 ACUTE ALCOHOLIC INTOXICATION WITHOUT COMPLICATION (HCC): ICD-10-CM

## 2019-06-01 DIAGNOSIS — F32.A DEPRESSION, UNSPECIFIED DEPRESSION TYPE: Primary | ICD-10-CM

## 2019-06-01 LAB
ACETAMINOPHEN LEVEL: <15 UG/ML
ALBUMIN SERPL-MCNC: 5.5 G/DL (ref 3.5–5.2)
ALP BLD-CCNC: 67 U/L (ref 40–130)
ALT SERPL-CCNC: 17 U/L (ref 5–41)
AMPHETAMINE SCREEN, URINE: NEGATIVE
ANION GAP SERPL CALCULATED.3IONS-SCNC: 16 MMOL/L (ref 7–19)
AST SERPL-CCNC: 21 U/L (ref 5–40)
BARBITURATE SCREEN URINE: NEGATIVE
BASOPHILS ABSOLUTE: 0.1 K/UL (ref 0–0.2)
BASOPHILS RELATIVE PERCENT: 0.5 % (ref 0–1)
BENZODIAZEPINE SCREEN, URINE: NEGATIVE
BILIRUB SERPL-MCNC: <0.2 MG/DL (ref 0.2–1.2)
BILIRUBIN URINE: NEGATIVE
BLOOD, URINE: NEGATIVE
BUN BLDV-MCNC: 8 MG/DL (ref 6–20)
CALCIUM SERPL-MCNC: 9.4 MG/DL (ref 8.6–10)
CANNABINOID SCREEN URINE: NEGATIVE
CHLORIDE BLD-SCNC: 103 MMOL/L (ref 98–111)
CLARITY: CLEAR
CO2: 22 MMOL/L (ref 22–29)
COCAINE METABOLITE SCREEN URINE: NEGATIVE
COLOR: YELLOW
CREAT SERPL-MCNC: 0.8 MG/DL (ref 0.5–1.2)
EOSINOPHILS ABSOLUTE: 0.1 K/UL (ref 0–0.6)
EOSINOPHILS RELATIVE PERCENT: 0.5 % (ref 0–5)
ETHANOL: 118 MG/DL (ref 0–0.08)
ETHANOL: 265 MG/DL (ref 0–0.08)
ETHANOL: 73 MG/DL (ref 0–0.08)
GFR NON-AFRICAN AMERICAN: >60
GLUCOSE BLD-MCNC: 121 MG/DL (ref 74–109)
GLUCOSE URINE: NEGATIVE MG/DL
HCT VFR BLD CALC: 48.1 % (ref 42–52)
HEMOGLOBIN: 16.6 G/DL (ref 14–18)
KETONES, URINE: NEGATIVE MG/DL
LEUKOCYTE ESTERASE, URINE: NEGATIVE
LYMPHOCYTES ABSOLUTE: 3.3 K/UL (ref 1.1–4.5)
LYMPHOCYTES RELATIVE PERCENT: 34.2 % (ref 20–40)
Lab: ABNORMAL
MCH RBC QN AUTO: 31.9 PG (ref 27–31)
MCHC RBC AUTO-ENTMCNC: 34.5 G/DL (ref 33–37)
MCV RBC AUTO: 92.5 FL (ref 80–94)
MONOCYTES ABSOLUTE: 0.5 K/UL (ref 0–0.9)
MONOCYTES RELATIVE PERCENT: 4.7 % (ref 0–10)
NEUTROPHILS ABSOLUTE: 5.8 K/UL (ref 1.5–7.5)
NEUTROPHILS RELATIVE PERCENT: 59.9 % (ref 50–65)
NITRITE, URINE: NEGATIVE
OPIATE SCREEN URINE: POSITIVE
PDW BLD-RTO: 12.2 % (ref 11.5–14.5)
PH UA: 6 (ref 5–8)
PLATELET # BLD: 332 K/UL (ref 130–400)
PMV BLD AUTO: 9.5 FL (ref 9.4–12.4)
POTASSIUM SERPL-SCNC: 4.6 MMOL/L (ref 3.5–5)
PROTEIN UA: NEGATIVE MG/DL
RBC # BLD: 5.2 M/UL (ref 4.7–6.1)
SALICYLATE, SERUM: <3 MG/DL (ref 3–10)
SODIUM BLD-SCNC: 141 MMOL/L (ref 136–145)
SPECIFIC GRAVITY UA: 1.01 (ref 1–1.03)
TOTAL PROTEIN: 7.9 G/DL (ref 6.6–8.7)
URINE REFLEX TO CULTURE: NORMAL
UROBILINOGEN, URINE: 0.2 E.U./DL
WBC # BLD: 9.7 K/UL (ref 4.8–10.8)

## 2019-06-01 PROCEDURE — G0480 DRUG TEST DEF 1-7 CLASSES: HCPCS

## 2019-06-01 PROCEDURE — 85025 COMPLETE CBC W/AUTO DIFF WBC: CPT

## 2019-06-01 PROCEDURE — 6370000000 HC RX 637 (ALT 250 FOR IP): Performed by: FAMILY MEDICINE

## 2019-06-01 PROCEDURE — 2580000003 HC RX 258: Performed by: EMERGENCY MEDICINE

## 2019-06-01 PROCEDURE — 80307 DRUG TEST PRSMV CHEM ANLYZR: CPT

## 2019-06-01 PROCEDURE — 6360000002 HC RX W HCPCS: Performed by: EMERGENCY MEDICINE

## 2019-06-01 PROCEDURE — 96366 THER/PROPH/DIAG IV INF ADDON: CPT

## 2019-06-01 PROCEDURE — 81003 URINALYSIS AUTO W/O SCOPE: CPT

## 2019-06-01 PROCEDURE — 99284 EMERGENCY DEPT VISIT MOD MDM: CPT

## 2019-06-01 PROCEDURE — 99285 EMERGENCY DEPT VISIT HI MDM: CPT | Performed by: EMERGENCY MEDICINE

## 2019-06-01 PROCEDURE — 80053 COMPREHEN METABOLIC PANEL: CPT

## 2019-06-01 PROCEDURE — 96365 THER/PROPH/DIAG IV INF INIT: CPT

## 2019-06-01 PROCEDURE — 6370000000 HC RX 637 (ALT 250 FOR IP): Performed by: EMERGENCY MEDICINE

## 2019-06-01 PROCEDURE — 2500000003 HC RX 250 WO HCPCS: Performed by: EMERGENCY MEDICINE

## 2019-06-01 PROCEDURE — 36415 COLL VENOUS BLD VENIPUNCTURE: CPT

## 2019-06-01 RX ORDER — QUETIAPINE FUMARATE 400 MG/1
400 TABLET, FILM COATED ORAL ONCE
Status: COMPLETED | OUTPATIENT
Start: 2019-06-01 | End: 2019-06-01

## 2019-06-01 RX ORDER — CLONAZEPAM 1 MG/1
1 TABLET ORAL ONCE
Status: COMPLETED | OUTPATIENT
Start: 2019-06-01 | End: 2019-06-01

## 2019-06-01 RX ORDER — ONDANSETRON 4 MG/1
4 TABLET, ORALLY DISINTEGRATING ORAL ONCE
Status: COMPLETED | OUTPATIENT
Start: 2019-06-01 | End: 2019-06-01

## 2019-06-01 RX ORDER — NICOTINE 21 MG/24HR
1 PATCH, TRANSDERMAL 24 HOURS TRANSDERMAL ONCE
Status: DISCONTINUED | OUTPATIENT
Start: 2019-06-01 | End: 2019-06-01 | Stop reason: HOSPADM

## 2019-06-01 RX ADMIN — CLONAZEPAM 1 MG: 1 TABLET ORAL at 13:42

## 2019-06-01 RX ADMIN — ONDANSETRON 4 MG: 4 TABLET, ORALLY DISINTEGRATING ORAL at 11:58

## 2019-06-01 RX ADMIN — QUETIAPINE FUMARATE 400 MG: 400 TABLET ORAL at 13:42

## 2019-06-01 RX ADMIN — FOLIC ACID: 5 INJECTION, SOLUTION INTRAMUSCULAR; INTRAVENOUS; SUBCUTANEOUS at 06:24

## 2019-06-01 ASSESSMENT — ENCOUNTER SYMPTOMS
SORE THROAT: 0
COLOR CHANGE: 0
DIARRHEA: 0
COUGH: 0
WHEEZING: 0
SHORTNESS OF BREATH: 0
NAUSEA: 0
VOMITING: 0
BACK PAIN: 0
ABDOMINAL PAIN: 0

## 2019-06-01 ASSESSMENT — LIFESTYLE VARIABLES: HISTORY_ALCOHOL_USE: YES

## 2019-06-01 ASSESSMENT — PATIENT HEALTH QUESTIONNAIRE - PHQ9: SUM OF ALL RESPONSES TO PHQ QUESTIONS 1-9: 18

## 2019-06-01 NOTE — ED NOTES
Bed: 06  Expected date:   Expected time:   Means of arrival: [facility]  Comments:  EMS: [address]     Ema Carson RN  06/01/19 5994

## 2019-06-01 NOTE — ED PROVIDER NOTES
[address] [facility] EMERGENCY DEPT  eMERGENCYdEPARTMENT eNCOUnter      Pt Name: Ellen Cortes  MRN: [id number]  [place] 1980  Date of evaluation: 6/1/2019  Geovanna García MD    Emergency Department care of this patient was assumed at 1500 from Dr. Meaghan Enamorado. We have discussed the case and the plan of care. I have seen and evaluated patient and reviewed ED course. CHIEF COMPLAINT       Chief Complaint   Patient presents with    Alcohol Intoxication     etoh intoxication, depression, recent girlfriend 6 months ago committed suicide    PHYSICAL EXAM    (up to 7 for level 4, 8 or more for level 5)     ED Triage Vitals   BP Temp Temp src Pulse Resp SpO2 Height Weight   06/01/19 0554 06/01/19 0554 -- 06/01/19 0554 06/01/19 0554 06/01/19 0554 06/01/19 0542 06/01/19 0542   139/88 98 °F (36.7 °C)  90 20 98 % 5' 7\" (1.702 m) 200 lb (90.7 kg)       Physical Exam   Constitutional: He is oriented to person, place, and time. He appears well-developed and well-nourished. HENT:   Head: Normocephalic and atraumatic. Eyes: Conjunctivae and EOM are normal.   Neck: Normal range of motion. Neck supple. No tracheal deviation present. Cardiovascular: Normal rate and regular rhythm. Pulmonary/Chest: Breath sounds normal. He has no wheezes. He has no rales. Musculoskeletal: Normal range of motion. He exhibits no edema. Neurological: He is alert and oriented to person, place, and time. Skin: Skin is warm and dry. Psychiatric: Thought content is not paranoid and not delusional. He exhibits a depressed mood. He expresses no homicidal and no suicidal ideation. Vitals reviewed.       DIAGNOSTIC RESULTS           No orders to display           LABS:  Labs Reviewed   CBC WITH AUTO DIFFERENTIAL - Abnormal; Notable for the following components:       Result Value    MCH 31.9 (*)     All other components within normal limits   COMPREHENSIVE METABOLIC PANEL - Abnormal; Notable for the following components:    Glucose 121 (*)     Alb 5.5 (*)     All other components within normal limits   URINE DRUG SCREEN - Abnormal; Notable for the following components:    Opiate Scrn, Ur Positive (*)     All other components within normal limits   ETHANOL   URINE RT REFLEX TO CULTURE   ACETAMINOPHEN LEVEL   SALICYLATE LEVEL   ETHANOL       All other labs were within normal range or not returned as of this dictation. EMERGENCY DEPARTMENT COURSE and DIFFERENTIAL DIAGNOSIS/MDM:   Vitals:    Vitals:    06/01/19 0900 06/01/19 1000 06/01/19 1100 06/01/19 1200   BP: 124/76 126/72 128/70 122/76   Pulse: 78 72 74 76   Resp: 17 18 18 17   Temp:       SpO2: 98% 98% 97% 98%   Weight:       Height:           MDM    Vital signs stable, assumed care from Dr. Jose Cruz Bustos, patient arrived here this morning alcohol intoxication, he admits to depression and dealing with recent memories of his girlfriend committing suicide. He denies any current suicidal ideation and states he would never harm himself. He was talking with a friend on the phone last night who called in a month for him and then he was brought here, pending sobriety and then will need mental health evaluation 5    Pt now sober, continues to deny SI, contracts for safety, followed closely by outpatient psych, gaurav has evaluated and spoke with Dr. Pola Roa ok to DC, he does not want to stay,  I have no reason to hold involuntarily currently, stable for DC, stressed to him may return if he has any concerns or needs help      CONSULTS:  None    PROCEDURES:  Unless otherwise noted below, none     Procedures    FINAL IMPRESSION      1. Depression, unspecified depression type    2. Acute alcoholic intoxication without complication (HCC)          DISPOSITION/PLAN   DISPOSITION     PATIENT REFERRED TO:  No follow-up provider specified.     DISCHARGE MEDICATIONS:  New Prescriptions    No medications on file          (Please note that portions ofthis note were completed with a voice recognition program.  Efforts were made to edit the dictations but occasionally words are mis-transcribed.)    Monique Garza MD(electronically signed)  Attending Emergency Physician          Bethany Montero MD  06/01/19 2029

## 2019-06-01 NOTE — ED NOTES
patientwoke up from a nap very angry speaking about his dog. He is worried who will take care of dog and stating \"If anything happens to my dog I dont know what I will do. \" \"This place will pay for this. You are holding me against my will. I am about to start smacking glass. \"            Nii Sexton RN  06/01/19 7060

## 2019-06-01 NOTE — ED NOTES
Pt placed in paper scrubs. Personal belongs removed and given to security. Suicidal Flowsheet Initiated. Sitter at bedside.       Muna Rivero RN  06/01/19 1945

## 2019-06-01 NOTE — ED NOTES
PT states that a representative from our facility contacted him to come in and be evaluated, PT denies being suicidal or homicidal but states he is very sad. PT states his girlfriend committed suicide approx 6 months ago.       Natalee Persaud RN  06/01/19 7186

## 2019-06-01 NOTE — ED PROVIDER NOTES
[facility] EMERGENCY DEPT  eMERGENCY dEPARTMENT eNCOUnter      Pt Name: Tato Pearce  MRN: [id number]  [place] 1980  Date of evaluation: 6/1/2019  Provider: Marcell Reyes MD    [address] [place] [address]       Chief Complaint   Patient presents with    Alcohol Intoxication         HISTORY OF PRESENT ILLNESS   (Location/Symptom, Timing/Onset,Context/Setting, Quality, Duration, Modifying Factors, Severity)  Note limiting factors. Tato Pearce is a 34 y.o. male who presents to the emergency department      She presents to the emergency department to the tearful state and in an intoxicated state. He states he's been severely depressed and is having trouble dealing with recent memories of his girlfriend committing suicide 6 months ago. He states he doesn't believe that he could commit suicide however he's been very depressed and is not on medications currently for this. NursingNotes were reviewed. REVIEW OF SYSTEMS    (2-9 systems for level 4, 10 or more for level 5)     Review of Systems   Constitutional: Positive for activity change, appetite change and fatigue. Negative for chills and fever. HENT: Negative for nosebleeds, postnasal drip and sore throat. Respiratory: Negative for cough, shortness of breath and wheezing. Cardiovascular: Negative for chest pain. Gastrointestinal: Negative for abdominal pain, diarrhea, nausea and vomiting. Genitourinary: Negative for dysuria. Musculoskeletal: Negative for back pain. Skin: Negative for color change. Neurological: Negative for weakness and headaches. Psychiatric/Behavioral: Positive for dysphoric mood and sleep disturbance. Negative for suicidal ideas. The patient is nervous/anxious.              PAST MEDICALHISTORY     Past Medical History:   Diagnosis Date    Anxiety     Depression          SURGICAL HISTORY       Past Surgical History:   Procedure Laterality Date    NECK SURGERY           CURRENT MEDICATIONS     Discharge Medication List as of 6/1/2019  7:54 PM      CONTINUE these medications which have NOT CHANGED    Details   clonazePAM (KLONOPIN) 0.5 MG tablet TAKE 1 TABLET BY MOUTH TWICE DAILY AS NEEDED FOR ANXIETY, Disp-60 tablet, R-0Normal      QUEtiapine (SEROQUEL) 400 MG tablet Take 1 tablet by mouth 2 times daily, Disp-60 tablet, R-3Normal      tamsulosin (FLOMAX) 0.4 MG capsule Take 0.4 mg by mouth daily, R-5Historical Med      ranitidine (ZANTAC) 150 MG tablet TK 1 T PO BID, R-0Historical Med      HYDROcodone-acetaminophen (NORCO) 5-325 MG per tablet TK 1 T PO D TO BID PRN FOR SEVERE PAIN. MUST LAST 30 DAYS, R-0Historical Med      ketoconazole (NIZORAL) 2 % shampoo Apply 2 mg topically daily, Topical, DAILY Starting Sat 3/17/2018, R-2, Historical Med      metoprolol tartrate (LOPRESSOR) 25 MG tablet TK 1 T PO BID, R-2Historical Med             ALLERGIES     Droperidol    FAMILY HISTORY     History reviewed. No pertinent family history. SOCIAL HISTORY       Social History     Socioeconomic History    Marital status: Single     Spouse name: None    Number of children: None    Years of education: None    Highest education level: None   Occupational History    None   Social Needs    Financial resource strain: None    Food insecurity:     Worry: None     Inability: None    Transportation needs:     Medical: None     Non-medical: None   Tobacco Use    Smoking status: Current Some Day Smoker    Smokeless tobacco: Current User     Types: Chew   Substance and Sexual Activity    Alcohol use:  Yes    Drug use: None    Sexual activity: None   Lifestyle    Physical activity:     Days per week: None     Minutes per session: None    Stress: None   Relationships    Social connections:     Talks on phone: None     Gets together: None     Attends Rastafarian service: None     Active member of club or organization: None     Attends meetings of clubs or organizations: None     Relationship status: None    Intimate partner violence:     Fear of current or ex partner: None     Emotionally abused: None     Physically abused: None     Forced sexual activity: None   Other Topics Concern    None   Social History Narrative    None       SCREENINGS             PHYSICAL EXAM    (up to 7 for level 4, 8 or more for level 5)     ED Triage Vitals   BP Temp Temp src Pulse Resp SpO2 Height Weight   06/01/19 0554 06/01/19 0554 -- 06/01/19 0554 06/01/19 0554 06/01/19 0554 06/01/19 0542 06/01/19 0542   139/88 98 °F (36.7 °C)  90 20 98 % 5' 7\" (1.702 m) 200 lb (90.7 kg)       Physical Exam   Constitutional: He appears well-developed and well-nourished. He appears lethargic. HENT:   Head: Normocephalic. Neck: Normal range of motion. Cardiovascular: Normal rate and normal heart sounds. Pulmonary/Chest: Effort normal and breath sounds normal.   Abdominal: Soft. Bowel sounds are normal.   Neurological: He appears lethargic. GCS eye subscore is 3. GCS verbal subscore is 4. GCS motor subscore is 6. Psychiatric: He has a normal mood and affect. His speech is slurred. He is slowed. He is not actively hallucinating. Cognition and memory are impaired. He expresses impulsivity. He expresses no homicidal and no suicidal ideation. He is attentive.        DIAGNOSTIC RESULTS     EKG: All EKG's areinterpreted by the Emergency Department Physician who either signs or Co-signs this chart in the absence of a cardiologist.        RADIOLOGY:  Non-plain film images such as CT, Ultrasound and MRI are read by the radiologist. Plain radiographic images are visualized and preliminarily interpreted bythe emergency physician with the below findings:          No orders to display           LABS:  Labs Reviewed   CBC WITH AUTO DIFFERENTIAL - Abnormal; Notable for the following components:       Result Value    MCH 31.9 (*)     All other components within normal limits   COMPREHENSIVE METABOLIC PANEL - Abnormal; Notable for the following components:    Glucose 121 (*)     Alb 5.5 (*) All other components within normal limits   URINE DRUG SCREEN - Abnormal; Notable for the following components:    Opiate Scrn, Ur Positive (*)     All other components within normal limits   ETHANOL   URINE RT REFLEX TO CULTURE   ACETAMINOPHEN LEVEL   SALICYLATE LEVEL   ETHANOL   ETHANOL       All other labs were within normal range or not returned as of this dictation. EMERGENCY DEPARTMENT COURSE and DIFFERENTIAL DIAGNOSIS/MDM:   Vitals:    Vitals:    06/01/19 1000 06/01/19 1100 06/01/19 1200 06/01/19 2001   BP: 126/72 128/70 122/76 111/67   Pulse: 72 74 76 101   Resp: 18 18 17 16   Temp:    97.7 °F (36.5 °C)   TempSrc:    Oral   SpO2: 98% 97% 98% 92%   Weight:       Height:           MDM    Reassessment  2:19 PM patient more alert and ambulating well slurred speech has resolved he has also become more agitated we will recheck his alcohol level to see if he is at the legal limit that our counselor can talk with him. CONSULTS:  IP CONSULT TO PSYCHIATRY    PROCEDURES:  Unless otherwise noted below, none     Procedures    FINAL IMPRESSION      1. Depression, unspecified depression type    2.  Acute alcoholic intoxication without complication [facility])          DISPOSITION/PLAN   DISPOSITION        PATIENT REFERRED TO:  [address] [place] [address] EMERGENCY DEPT  [facility]  [phone]    As needed    [facility]  [phone]    As needed      DISCHARGE MEDICATIONS:  Discharge Medication List as of 6/1/2019  7:54 PM             (Please note that portions of this note were completed with a voice recognition program.  Efforts were made to edit thedictations but occasionally words are mis-transcribed.)    Twan Srinivasan MD (electronically signed)  Attending Emergency Physician         Can Ricks MD  06/07/19 7881
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
## 2019-06-24 DIAGNOSIS — F41.9 ANXIETY: ICD-10-CM

## 2019-06-24 NOTE — TELEPHONE ENCOUNTER
Provider   clonazePAM (KLONOPIN) 0.5 MG tablet TAKE 1 TABLET BY MOUTH TWICE DAILY AS NEEDED FOR ANXIETY 5/28/19 6/27/19  ALO Nunez - NP   QUEtiapine (SEROQUEL) 400 MG tablet Take 1 tablet by mouth 2 times daily 3/15/19   ALO Disla   tamsulosin [facility]) 0.4 MG capsule Take 0.4 mg by mouth daily 3/7/18   Historical Provider, MD   ketoconazole (NIZORAL) 2 % shampoo Apply 2 mg topically daily 3/17/18   Historical Provider, MD   metoprolol tartrate (LOPRESSOR) 25 MG tablet TK 1 T PO BID 10/18/17   Historical Provider, MD   ranitidine (ZANTAC) 150 MG tablet TK 1 T PO BID 10/11/17   Historical Provider, MD   HYDROcodone-acetaminophen (NORCO) 5-325 MG per tablet TK 1 T PO D TO BID PRN FOR SEVERE PAIN.  MUST LAST 30 DAYS 9/11/17   Historical Provider, MD      Lab Review   Admission on 06/01/2019, Discharged on 06/01/2019   Component Date Value    WBC 06/01/2019 9.7     RBC 06/01/2019 5.20     Hemoglobin 06/01/2019 16.6     Hematocrit 06/01/2019 48.1     MCV 06/01/2019 92.5     MCH 06/01/2019 31.9*    MCHC 06/01/2019 34.5     RDW 06/01/2019 12.2     Platelets 67/64/2594 332     MPV 06/01/2019 9.5     Neutrophils % 06/01/2019 59.9     Lymphocytes % 06/01/2019 34.2     Monocytes % 06/01/2019 4.7     Eosinophils % 06/01/2019 0.5     Basophils % 06/01/2019 0.5     Neutrophils # 06/01/2019 5.8     Lymphocytes # 06/01/2019 3.3     Monocytes # 06/01/2019 0.50     Eosinophils # 06/01/2019 0.10     Basophils # 06/01/2019 0.10     Sodium 06/01/2019 141     Potassium 06/01/2019 4.6     Chloride 06/01/2019 103     CO2 06/01/2019 22     Anion Gap 06/01/2019 16     Glucose 06/01/2019 121*    BUN 06/01/2019 8     CREATININE 06/01/2019 0.8     GFR Non- 06/01/2019 >60     Calcium 06/01/2019 9.4     Total Protein 06/01/2019 7.9     Alb 06/01/2019 5.5*    Total Bilirubin 06/01/2019 <0.2     Alkaline Phosphatase 06/01/2019 67     ALT 06/01/2019 17     AST 06/01/2019 21  Ethanol Lvl 06/01/2019 265     Color, UA 06/01/2019 YELLOW     Clarity, UA 06/01/2019 Clear     Glucose, Ur 06/01/2019 Negative     Bilirubin Urine 06/01/2019 Negative     Ketones, Urine 06/01/2019 Negative     Specific Gravity, UA 06/01/2019 1.012     Blood, Urine 06/01/2019 Negative     pH, UA 06/01/2019 6.0     Protein, UA 06/01/2019 Negative     Urobilinogen, Urine 06/01/2019 0.2     Nitrite, Urine 06/01/2019 Negative     Leukocyte Esterase, Urine 06/01/2019 Negative     Urine Reflex to Culture 06/01/2019 Not Indicated     Amphetamine Screen, Urine 06/01/2019 Negative     Barbiturate Screen, Ur 06/01/2019 Negative     Benzodiazepine Screen, U* 06/01/2019 Negative     Cannabinoid Scrn, Ur 06/01/2019 Negative     Cocaine Metabolite Scree* 06/01/2019 Negative     Opiate Scrn, Ur 06/01/2019 Positive*    Drug Screen Comment: 06/01/2019 see below     Acetaminophen Level 31/46/6440 <29     Salicylate, Serum 68/85/1372 <3.0     Ethanol Lvl 06/01/2019 118     Ethanol Lvl 06/01/2019 73          MSE:  Patient is  A & O x3. Appearance:  Neck tattoos, street clothes, in chair, good grooming and good hygiene appropriately dressed for season and age. Cognition:  Recent memory intact , remote memory intact , good fund of knowledge, average  intelligence level. Speech:  normal  Language: Naming: intact; Word Finding: intact  Conversation no evidence of delusions  Behavior:  Cooperative and Good eye contact  Mood: anxious  Affect: congruent with mood  Thought Content: no evidence of overt psychosis, delusional thought or suicidal /homicidal ideation or plan  Thought Process: goal directed and coherent and associations appropriate  Concentration/ attention span: variable  Judgement Insight:  fair and fair  Gait and Station:normal gait and station and normal balance   Musculoskeletal: WNL      Assesment:   1. Anxiety        Plan:  Continue medications as prescribed.    1. The risks, benefits, side effects, indications, contraindications, and adverse effects of the medications have been discussed. Yes.  2. The pt has verbalized understanding and has capacity to give informed consent. 3. The Juanjo Guerra report has been reviewed according to [facility] regulations. 4. Supportive therapy offered. 5. Follow up: No follow-ups on file. 6. The patient has been advised to call with any problems. 7. Controlled substance Treatment Plan: this is a maintenance dose. .  8. The above listed medications have been continued, modifications in meds and other orders/labs as follows: No orders of the defined types were placed in this encounter. No orders of the defined types were placed in this encounter. 9. Additional comments:    Over 50% of the total visit time was spent on counseling and/or coordination of care.     Everette Ganser, PAULP-BC

## 2019-06-25 RX ORDER — CLONAZEPAM 0.5 MG/1
TABLET ORAL
Qty: 60 TABLET | Refills: 0 | Status: SHIPPED | OUTPATIENT
Start: 2019-06-25 | End: 2019-07-24 | Stop reason: SDUPTHER

## 2019-07-17 ENCOUNTER — TELEPHONE (OUTPATIENT)
Dept: PSYCHIATRY | Age: 39
End: 2019-07-17

## 2019-07-24 ENCOUNTER — TELEPHONE (OUTPATIENT)
Dept: PSYCHIATRY | Age: 39
End: 2019-07-24

## 2019-07-24 ENCOUNTER — OFFICE VISIT (OUTPATIENT)
Dept: PSYCHIATRY | Age: 39
End: 2019-07-24
Payer: COMMERCIAL

## 2019-07-24 VITALS
WEIGHT: 182 LBS | HEART RATE: 109 BPM | BODY MASS INDEX: 28.51 KG/M2 | SYSTOLIC BLOOD PRESSURE: 119 MMHG | DIASTOLIC BLOOD PRESSURE: 86 MMHG

## 2019-07-24 DIAGNOSIS — F41.0 PANIC DISORDER: ICD-10-CM

## 2019-07-24 DIAGNOSIS — F31.31 BIPOLAR I DISORDER, MOST RECENT EPISODE DEPRESSED, MILD (HCC): Primary | ICD-10-CM

## 2019-07-24 DIAGNOSIS — Z78.9 ALCOHOL USE: ICD-10-CM

## 2019-07-24 DIAGNOSIS — F41.9 ANXIETY: ICD-10-CM

## 2019-07-24 PROCEDURE — 99214 OFFICE O/P EST MOD 30 MIN: CPT | Performed by: PSYCHIATRY & NEUROLOGY

## 2019-07-24 RX ORDER — LANOLIN ALCOHOL/MO/W.PET/CERES
3 CREAM (GRAM) TOPICAL NIGHTLY PRN
Qty: 30 TABLET | Refills: 1 | Status: SHIPPED | OUTPATIENT
Start: 2019-07-24 | End: 2019-08-23

## 2019-07-24 RX ORDER — DIVALPROEX SODIUM 500 MG/1
500 TABLET, EXTENDED RELEASE ORAL 2 TIMES DAILY
COMMUNITY
Start: 2017-04-28 | End: 2020-03-04 | Stop reason: SDUPTHER

## 2019-07-24 RX ORDER — QUETIAPINE FUMARATE 400 MG/1
200 TABLET, FILM COATED ORAL NIGHTLY
Qty: 60 TABLET | Refills: 1 | Status: SHIPPED | OUTPATIENT
Start: 2019-07-24 | End: 2019-09-04 | Stop reason: ALTCHOICE

## 2019-07-24 RX ORDER — BUPROPION HYDROCHLORIDE 100 MG/1
100 TABLET ORAL 2 TIMES DAILY
Qty: 60 TABLET | Refills: 1 | Status: SHIPPED | OUTPATIENT
Start: 2019-07-24 | End: 2019-08-07 | Stop reason: SINTOL

## 2019-07-24 RX ORDER — CLONAZEPAM 0.5 MG/1
TABLET ORAL
Qty: 60 TABLET | Refills: 1 | OUTPATIENT
Start: 2019-07-24 | End: 2019-08-22 | Stop reason: SDUPTHER

## 2019-07-24 NOTE — PROGRESS NOTES
7/24/2019 5:10 PM   Progress Note        Joi Hatfield 1980     Chief Complaint   Patient presents with    Depression    Anxiety       Subjective:  38 yo CM with history of Bipolar I, anxiety, panic attacks, alcohol use, and chronic pain, who presents today for follow-up. Last seen in clinic in March and prior records and labs were reviewed. Patient is accompanied by his partner, Edward Gomez. Last time patient was kept on Klonopin and Seroquel. He also reports taking Depakote 1000 mg daily which he has been noncompliant with. He denies side effects on his current regimen, however, states that he has run out of Klonopin a few days ago and feels jittery as if he is in withdrawal.    Patient is calm and cooperative. Smiling at times. Reports feeling depressed and having no motivation to do things. States recent girlfriend committed suicide about 7 months ago and he is still trying to get over it. Self-medicated with alcohol and was in the ER in June. He denies drug use. Having mood swings and functioning poorly. Anxiety is still significant. He reports panic attacks which impairs his functioning. \"I just want to feel normal again. I feel normal when I take my Klonopin. \"  States he recently drank alcohol to self medicate for withdrawal.  Feels that his benzodiazepines were caught too abruptly. States he has been on various benzodiazepines for many years and is willing to get off Klonopin in the future. He has been sleeping poorly. Appetite is good. He denies suicidal, homicidal ideation and auditory and visual hallucinations. Patient states he has tried multiple SSRIs including Zoloft, Prozac, Celexa, Paxil, Cymbalta, all of which made his mood worse. He has never tried Wellbutrin and agreed to initiate it today. He also agreed to cut  his dose of Seroquel given its side effects, in particular metabolic (patient has history of heart attack in the family).   He agreed to try melatonin as needed for  Eosinophils % 06/01/2019 0.5     Basophils % 06/01/2019 0.5     Neutrophils # 06/01/2019 5.8     Lymphocytes # 06/01/2019 3.3     Monocytes # 06/01/2019 0.50     Eosinophils # 06/01/2019 0.10     Basophils # 06/01/2019 0.10     Sodium 06/01/2019 141     Potassium 06/01/2019 4.6     Chloride 06/01/2019 103     CO2 06/01/2019 22     Anion Gap 06/01/2019 16     Glucose 06/01/2019 121*    BUN 06/01/2019 8     CREATININE 06/01/2019 0.8     GFR Non- 06/01/2019 >60     Calcium 06/01/2019 9.4     Total Protein 06/01/2019 7.9     Alb 06/01/2019 5.5*    Total Bilirubin 06/01/2019 <0.2     Alkaline Phosphatase 06/01/2019 67     ALT 06/01/2019 17     AST 06/01/2019 21     Ethanol Lvl 06/01/2019 265     Color, UA 06/01/2019 YELLOW     Clarity, UA 06/01/2019 Clear     Glucose, Ur 06/01/2019 Negative     Bilirubin Urine 06/01/2019 Negative     Ketones, Urine 06/01/2019 Negative     Specific Gravity, UA 06/01/2019 1.012     Blood, Urine 06/01/2019 Negative     pH, UA 06/01/2019 6.0     Protein, UA 06/01/2019 Negative     Urobilinogen, Urine 06/01/2019 0.2     Nitrite, Urine 06/01/2019 Negative     Leukocyte Esterase, Urine 06/01/2019 Negative     Urine Reflex to Culture 06/01/2019 Not Indicated     Amphetamine Screen, Urine 06/01/2019 Negative     Barbiturate Screen, Ur 06/01/2019 Negative     Benzodiazepine Screen, U* 06/01/2019 Negative     Cannabinoid Scrn, Ur 06/01/2019 Negative     Cocaine Metabolite Scree* 06/01/2019 Negative     Opiate Scrn, Ur 06/01/2019 Positive*    Drug Screen Comment: 06/01/2019 see below     Acetaminophen Level 68/16/1058 <36     Salicylate, Serum 73/10/3255 <3.0     Ethanol Lvl 06/01/2019 118     Ethanol Lvl 06/01/2019 73      Review of Systems: 14 point review negative today except as described above    MSE:  Patient is  A & O x3.    Appearance:  Neck tattoos, street clothes, good hygiene, appropriately dressed for season and

## 2019-07-31 RX ORDER — DIAZEPAM 2 MG/1
2 TABLET ORAL NIGHTLY PRN
Qty: 10 TABLET | Refills: 0 | Status: SHIPPED | OUTPATIENT
Start: 2019-07-31 | End: 2019-07-31

## 2019-08-07 ENCOUNTER — TELEPHONE (OUTPATIENT)
Dept: PSYCHIATRY | Age: 39
End: 2019-08-07

## 2019-08-09 ENCOUNTER — TELEPHONE (OUTPATIENT)
Dept: PSYCHIATRY | Age: 39
End: 2019-08-09

## 2019-08-14 ENCOUNTER — TELEPHONE (OUTPATIENT)
Dept: PSYCHIATRY | Age: 39
End: 2019-08-14

## 2019-08-14 ENCOUNTER — FOLLOWUP TELEPHONE ENCOUNTER (OUTPATIENT)
Dept: PSYCHIATRY | Age: 39
End: 2019-08-14

## 2019-08-21 DIAGNOSIS — F41.9 ANXIETY: ICD-10-CM

## 2019-08-21 NOTE — TELEPHONE ENCOUNTER
Provider, MD   ranitidine (ZANTAC) 150 MG tablet TK 1 T PO BID 10/11/17     Historical Provider, MD   HYDROcodone-acetaminophen (NORCO) 5-325 MG per tablet TK 1 T PO D TO BID PRN FOR SEVERE PAIN.  MUST LAST 30 DAYS 9/11/17     Historical Provider, MD         Lab Review         Admission on 06/01/2019, Discharged on 06/01/2019   Component Date Value    WBC 06/01/2019 9.7     RBC 06/01/2019 5.20     Hemoglobin 06/01/2019 16.6     Hematocrit 06/01/2019 48.1     MCV 06/01/2019 92.5     MCH 06/01/2019 31.9*    MCHC 06/01/2019 34.5     RDW 06/01/2019 12.2     Platelets 87/94/7095 332     MPV 06/01/2019 9.5     Neutrophils % 06/01/2019 59.9     Lymphocytes % 06/01/2019 34.2     Monocytes % 06/01/2019 4.7     Eosinophils % 06/01/2019 0.5     Basophils % 06/01/2019 0.5     Neutrophils # 06/01/2019 5.8     Lymphocytes # 06/01/2019 3.3     Monocytes # 06/01/2019 0.50     Eosinophils # 06/01/2019 0.10     Basophils # 06/01/2019 0.10     Sodium 06/01/2019 141     Potassium 06/01/2019 4.6     Chloride 06/01/2019 103     CO2 06/01/2019 22     Anion Gap 06/01/2019 16     Glucose 06/01/2019 121*    BUN 06/01/2019 8     CREATININE 06/01/2019 0.8     GFR Non- 06/01/2019 >60     Calcium 06/01/2019 9.4     Total Protein 06/01/2019 7.9     Alb 06/01/2019 5.5*    Total Bilirubin 06/01/2019 <0.2     Alkaline Phosphatase 06/01/2019 67     ALT 06/01/2019 17     AST 06/01/2019 21     Ethanol Lvl 06/01/2019 265     Color, UA 06/01/2019 YELLOW     Clarity, UA 06/01/2019 Clear     Glucose, Ur 06/01/2019 Negative     Bilirubin Urine 06/01/2019 Negative     Ketones, Urine 06/01/2019 Negative     Specific Gravity, UA 06/01/2019 1.012     Blood, Urine 06/01/2019 Negative     pH, UA 06/01/2019 6.0     Protein, UA 06/01/2019 Negative     Urobilinogen, Urine 06/01/2019 0.2     Nitrite, Urine 06/01/2019 Negative     Leukocyte Esterase, Urine 06/01/2019 Negative     Urine Reflex to Culture MD on 7/24/19 at 17:18   Orders Placed      None   Medication Changes         buPROPion HCl 100 mg Oral 2 TIMES DAILY       Melatonin 3 mg Oral NIGHTLY PRN       QUEtiapine Fumarate 200 mg Oral NIGHTLY          (Therapy completed)      Medication List    Visit Diagnoses         Bipolar I disorder, most recent episode depressed, mild ([address]      Anxiety      Panic disorder      Alcohol use      Problem List

## 2019-08-22 ENCOUNTER — TELEPHONE (OUTPATIENT)
Dept: PSYCHIATRY | Age: 39
End: 2019-08-22

## 2019-08-22 RX ORDER — CLONAZEPAM 0.5 MG/1
TABLET ORAL
Qty: 60 TABLET | Refills: 1 | Status: SHIPPED | OUTPATIENT
Start: 2019-08-22 | End: 2019-11-24 | Stop reason: SDUPTHER

## 2019-08-28 ENCOUNTER — TELEPHONE (OUTPATIENT)
Dept: PSYCHIATRY | Age: 39
End: 2019-08-28

## 2019-08-28 RX ORDER — TRAZODONE HYDROCHLORIDE 50 MG/1
50 TABLET ORAL NIGHTLY
Qty: 30 TABLET | Refills: 2 | Status: SHIPPED | OUTPATIENT
Start: 2019-08-28 | End: 2019-09-03

## 2019-09-03 ENCOUNTER — TELEPHONE (OUTPATIENT)
Dept: PSYCHIATRY | Age: 39
End: 2019-09-03

## 2019-09-03 RX ORDER — TRAZODONE HYDROCHLORIDE 100 MG/1
150 TABLET ORAL NIGHTLY
Qty: 60 TABLET | Refills: 1 | Status: SHIPPED | OUTPATIENT
Start: 2019-09-03 | End: 2019-09-25 | Stop reason: ALTCHOICE

## 2019-09-04 ENCOUNTER — HOSPITAL ENCOUNTER (EMERGENCY)
Age: 39
Discharge: HOME | End: 2019-09-04
Attending: EMERGENCY MEDICINE
Payer: COMMERCIAL

## 2019-09-04 VITALS
OXYGEN SATURATION: 97 % | SYSTOLIC BLOOD PRESSURE: 103 MMHG | DIASTOLIC BLOOD PRESSURE: 73 MMHG | RESPIRATION RATE: 16 BRPM | TEMPERATURE: 98.3 F | HEART RATE: 78 BPM

## 2019-09-04 DIAGNOSIS — G47.00 INSOMNIA, UNSPECIFIED TYPE: Primary | ICD-10-CM

## 2019-09-04 PROCEDURE — 99282 EMERGENCY DEPT VISIT SF MDM: CPT

## 2019-09-04 ASSESSMENT — ENCOUNTER SYMPTOMS
NAUSEA: 0
SHORTNESS OF BREATH: 0
SORE THROAT: 0
BACK PAIN: 0
ABDOMINAL PAIN: 0
RHINORRHEA: 0
VOMITING: 0
COUGH: 0
DIARRHEA: 0
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
## 2019-09-06 ENCOUNTER — TELEPHONE (OUTPATIENT)
Dept: PSYCHIATRY | Age: 39
End: 2019-09-06

## 2019-09-08 RX ORDER — DOXEPIN HYDROCHLORIDE 25 MG/1
25 CAPSULE ORAL NIGHTLY
Qty: 30 CAPSULE | Refills: 0 | Status: SHIPPED | OUTPATIENT
Start: 2019-09-08 | End: 2019-09-25 | Stop reason: ALTCHOICE

## 2019-09-13 ENCOUNTER — TELEPHONE (OUTPATIENT)
Dept: PSYCHIATRY | Age: 39
End: 2019-09-13

## 2019-09-17 ENCOUNTER — TELEPHONE (OUTPATIENT)
Dept: PSYCHIATRY | Age: 39
End: 2019-09-17

## 2019-09-17 NOTE — TELEPHONE ENCOUNTER
Pt called and left VM this am stating that he did not like the way he felt on his current meds. \"I feel like crap\". Pt stated he did not want to go to therapy \"just because the doctor can not get my meds right\". Pt stated, \"If I need to go somewhere else just tell me\". Dr Ranjit Manley given the above info-she stated that she needs to see him at his appt next week before making med changes. Pt was contacted and given the Dr recommendation. Pt agreeable to make sure and attend his appt next week. He was also made aware that if he desired he could get a second opinion. Discussed benefits of individual therapy. Pt apologized for his remarks of frustration during his VM earlier today. He verbalized understanding of info provided and agreeable to the plan except he would not give a commitment to individual therapy.

## 2019-09-25 ENCOUNTER — OFFICE VISIT (OUTPATIENT)
Dept: PSYCHIATRY | Age: 39
End: 2019-09-25
Payer: COMMERCIAL

## 2019-09-25 VITALS
OXYGEN SATURATION: 99 % | BODY MASS INDEX: 28.56 KG/M2 | SYSTOLIC BLOOD PRESSURE: 136 MMHG | DIASTOLIC BLOOD PRESSURE: 88 MMHG | HEIGHT: 67 IN | TEMPERATURE: 97.3 F | HEART RATE: 77 BPM | WEIGHT: 182 LBS

## 2019-09-25 DIAGNOSIS — F31.32 BIPOLAR I DISORDER, MOST RECENT EPISODE DEPRESSED, MODERATE (HCC): Primary | ICD-10-CM

## 2019-09-25 DIAGNOSIS — F31.32 BIPOLAR I DISORDER, MOST RECENT EPISODE DEPRESSED, MODERATE (HCC): ICD-10-CM

## 2019-09-25 DIAGNOSIS — E78.00 HYPERCHOLESTEROLEMIA: ICD-10-CM

## 2019-09-25 DIAGNOSIS — E53.8 VITAMIN B12 DEFICIENCY: ICD-10-CM

## 2019-09-25 DIAGNOSIS — E55.9 VITAMIN D DEFICIENCY: ICD-10-CM

## 2019-09-25 LAB
CHOLESTEROL, TOTAL: 186 MG/DL (ref 160–199)
FOLATE: >20 NG/ML (ref 4.5–32.2)
HDLC SERPL-MCNC: 37 MG/DL (ref 55–121)
LDL CHOLESTEROL CALCULATED: 120 MG/DL
TRIGL SERPL-MCNC: 147 MG/DL (ref 0–149)
VALPROIC ACID LEVEL: 40.6 UG/ML (ref 50–100)
VITAMIN B-12: 998 PG/ML (ref 211–946)
VITAMIN D 25-HYDROXY: 20.2 NG/ML

## 2019-09-25 PROCEDURE — 99214 OFFICE O/P EST MOD 30 MIN: CPT | Performed by: PSYCHIATRY & NEUROLOGY

## 2019-09-25 RX ORDER — BUPROPION HYDROCHLORIDE 100 MG/1
TABLET ORAL
Qty: 83 TABLET | Refills: 0 | Status: SHIPPED | OUTPATIENT
Start: 2019-09-25 | End: 2019-12-04 | Stop reason: SDUPTHER

## 2019-09-25 RX ORDER — CLONAZEPAM 0.5 MG/1
0.5 TABLET ORAL 2 TIMES DAILY PRN
Refills: 1 | Status: ON HOLD | COMMUNITY
Start: 2019-09-21 | End: 2021-01-28 | Stop reason: HOSPADM

## 2019-09-26 ENCOUNTER — TELEPHONE (OUTPATIENT)
Dept: PSYCHIATRY | Age: 39
End: 2019-09-26

## 2019-09-26 RX ORDER — ERGOCALCIFEROL 1.25 MG/1
50000 CAPSULE ORAL WEEKLY
Qty: 12 CAPSULE | Refills: 1 | Status: SHIPPED | OUTPATIENT
Start: 2019-09-26 | End: 2019-12-04 | Stop reason: SDUPTHER

## 2019-11-24 DIAGNOSIS — F41.9 ANXIETY: ICD-10-CM

## 2019-11-25 RX ORDER — CLONAZEPAM 0.5 MG/1
TABLET ORAL
Qty: 60 TABLET | Refills: 3 | Status: SHIPPED | OUTPATIENT
Start: 2019-11-25 | End: 2020-01-27 | Stop reason: SDUPTHER

## 2019-12-04 ENCOUNTER — OFFICE VISIT (OUTPATIENT)
Dept: PSYCHIATRY | Age: 39
End: 2019-12-04
Payer: COMMERCIAL

## 2019-12-04 VITALS
DIASTOLIC BLOOD PRESSURE: 89 MMHG | HEART RATE: 112 BPM | TEMPERATURE: 97.4 F | BODY MASS INDEX: 27.78 KG/M2 | OXYGEN SATURATION: 97 % | WEIGHT: 177 LBS | SYSTOLIC BLOOD PRESSURE: 129 MMHG | HEIGHT: 67 IN

## 2019-12-04 DIAGNOSIS — Z79.899 HISTORY OF LONG-TERM TREATMENT WITH HIGH-RISK MEDICATION: Primary | ICD-10-CM

## 2019-12-04 DIAGNOSIS — Z79.899 HIGH RISK MEDICATION USE: ICD-10-CM

## 2019-12-04 DIAGNOSIS — Z79.899 HISTORY OF LONG-TERM TREATMENT WITH HIGH-RISK MEDICATION: ICD-10-CM

## 2019-12-04 DIAGNOSIS — F41.9 ANXIETY: ICD-10-CM

## 2019-12-04 DIAGNOSIS — F31.31 BIPOLAR I DISORDER, MOST RECENT EPISODE DEPRESSED, MILD (HCC): Primary | ICD-10-CM

## 2019-12-04 LAB
AMPHETAMINE SCREEN, URINE: NEGATIVE
BARBITURATE SCREEN URINE: NEGATIVE
BENZODIAZEPINE SCREEN, URINE: NEGATIVE
CANNABINOID SCREEN URINE: NEGATIVE
COCAINE METABOLITE SCREEN URINE: NEGATIVE
HBA1C MFR BLD: 5.8 % (ref 4–6)
Lab: NORMAL
OPIATE SCREEN URINE: NEGATIVE
TSH REFLEX FT4: 0.97 UIU/ML (ref 0.35–5.5)

## 2019-12-04 PROCEDURE — 99214 OFFICE O/P EST MOD 30 MIN: CPT | Performed by: PSYCHIATRY & NEUROLOGY

## 2019-12-04 RX ORDER — BUPROPION HYDROCHLORIDE 100 MG/1
300 TABLET ORAL DAILY
Qty: 90 TABLET | Refills: 3 | Status: SHIPPED | OUTPATIENT
Start: 2019-12-04 | End: 2020-03-04 | Stop reason: SDUPTHER

## 2019-12-04 RX ORDER — ERGOCALCIFEROL 1.25 MG/1
50000 CAPSULE ORAL WEEKLY
Qty: 12 CAPSULE | Refills: 1 | Status: SHIPPED | OUTPATIENT
Start: 2019-12-04 | End: 2020-06-20

## 2019-12-04 RX ORDER — QUETIAPINE FUMARATE 200 MG/1
200 TABLET, FILM COATED ORAL 2 TIMES DAILY
Qty: 60 TABLET | Refills: 2 | Status: SHIPPED | OUTPATIENT
Start: 2019-12-04 | End: 2020-03-04 | Stop reason: SDUPTHER

## 2019-12-13 ENCOUNTER — TELEPHONE (OUTPATIENT)
Dept: PSYCHIATRY | Age: 39
End: 2019-12-13

## 2020-01-16 ENCOUNTER — TELEPHONE (OUTPATIENT)
Dept: PSYCHIATRY | Age: 40
End: 2020-01-16

## 2020-01-16 NOTE — TELEPHONE ENCOUNTER
Pt called and stated that he couldn't get his medication refilled at his pharmacy that is [facility] in [facility]. I explained to the patient the reason was because it was a [address] [place] [address] S provider and with insurance they was not allowing pt to fill out of state. So I asked the patient if I could call him back and let me work on what we needed to do. Patient voiced understanding and stated I could call him back.

## 2020-01-16 NOTE — TELEPHONE ENCOUNTER
I have contacted [id number] [facility] to let them know that we have transferred  the following medications to [place] in [facility] mound at [facility]. wellbutrin 100mg then on pt seroquel 200 mg and then on his klonopin 0.5 mg. Pharmacy voiced understanding.

## 2020-01-16 NOTE — TELEPHONE ENCOUNTER
I have contacted pt to let him know that the [facility] in [facility] at [facility] could fill his medications and we have transferred them and there was no co pay for him. Pt was very grateful of this. I then explained to the patient that with him having the out of state medicaid insurance that the office was not accepting that and that we could still treat him and he would just be a self pay patient and it would 40.00 at the time of visit and then he would be billed the rest. Pt then asked if there was any kind of help if he could not pay the bill and I explained to him the Financial Assistance form we could have him complete and turn in for maybe approval to help cover his visits. Patient voiced understanding of this and said he would like to do this and see if they would help and if not then see about finding a provider that did accept his insurance. Form is being mailed so pt can have it completed before his next appointment.

## 2020-01-27 NOTE — TELEPHONE ENCOUNTER
tablet Take 500 mg by mouth 2 times daily 4/28/17     Historical Provider, MD   metoprolol tartrate (LOPRESSOR) 25 MG tablet TK 1 T PO BID 10/18/17     Historical Provider, MD   ranitidine (ZANTAC) 150 MG tablet TK 1 T PO BID 10/11/17     Historical Provider, MD         Lab Review         No visits with results within 2 Month(s) from this visit. Latest known visit with results is:   Orders Only on 09/25/2019   Component Date Value    Valproic Acid Lvl 09/25/2019 40.6*    Cholesterol, Total 09/25/2019 186     Triglycerides 09/25/2019 147     HDL 09/25/2019 37*    LDL Calculated 09/25/2019 120     Vit D, 25-Hydroxy 09/25/2019 20.2*    Vitamin B-12 09/25/2019 998*    Folate 09/25/2019 >20.0       Review of Systems: 14 point review negative today except as described above     MSE:  Appearance: Appropriately groomed and casually dressed. Made good eye contact. Behavior: Calm, cooperative, and socially appropriate. No psychomotor retardation appreciated. Gait unremarkable. Speech: Normal in tone, volume, and quality. No slurring, dysarthria or pressured speech noted. Mood: \"So much better\"   Affect: Mood congruent. Thought Process: Appears linear, logical and goal oriented. Causality appears intact. Thought Content: Denies active suicidal and homicidal ideations. No overt delusions or paranoia appreciated. Perceptions: Denies auditory or visual hallucinations at present time. Not responding to internal stimuli. Concentration: Intact. Orientation: to person, place, date, and situation. Language: Intact. Fund of information: Intact. Memory: Recent and remote appear intact. Impulsivity: Limited. Neurovegitative: Fair appetite and poor sleep. Insight: Fair. Judgment: Fair.     Assesment:      1. Bipolar I disorder, most recent episode depressed, mild ([address]    2. Anxiety    3. High risk medication use       Significant improvement in mood and affect. Patient is psychiatrically stable. Dose?       Answer:   12/4 am         9. Order TSH, A1c, UDS and Depakote level. Patient may benefit from a sleep study.        Over 50% of the total visit time (25 min) was spent on counseling and/or coordination of care of:     1. Bipolar I disorder, most recent episode depressed, mild ([facility] [address]    2. Anxiety    3.  High risk medication use

## 2020-01-28 ENCOUNTER — TELEPHONE (OUTPATIENT)
Dept: PSYCHIATRY | Age: 40
End: 2020-01-28

## 2020-01-28 RX ORDER — CLONAZEPAM 0.5 MG/1
TABLET ORAL
Qty: 60 TABLET | Refills: 3 | Status: SHIPPED | OUTPATIENT
Start: 2020-01-28 | End: 2020-02-24

## 2020-02-11 ENCOUNTER — TELEPHONE (OUTPATIENT)
Dept: PSYCHIATRY | Age: 40
End: 2020-02-11

## 2020-03-04 ENCOUNTER — OFFICE VISIT (OUTPATIENT)
Dept: PSYCHIATRY | Age: 40
End: 2020-03-04
Payer: COMMERCIAL

## 2020-03-04 VITALS
WEIGHT: 185 LBS | OXYGEN SATURATION: 96 % | SYSTOLIC BLOOD PRESSURE: 106 MMHG | DIASTOLIC BLOOD PRESSURE: 74 MMHG | HEART RATE: 97 BPM | HEIGHT: 67 IN | BODY MASS INDEX: 29.03 KG/M2

## 2020-03-04 PROCEDURE — 99214 OFFICE O/P EST MOD 30 MIN: CPT | Performed by: PSYCHIATRY & NEUROLOGY

## 2020-03-04 RX ORDER — QUETIAPINE FUMARATE 200 MG/1
200 TABLET, FILM COATED ORAL NIGHTLY
Qty: 30 TABLET | Refills: 3 | Status: SHIPPED | OUTPATIENT
Start: 2020-03-04 | End: 2020-09-04

## 2020-03-04 RX ORDER — DIVALPROEX SODIUM 500 MG/1
500 TABLET, EXTENDED RELEASE ORAL 2 TIMES DAILY
Qty: 60 TABLET | Refills: 3 | Status: SHIPPED | OUTPATIENT
Start: 2020-03-04 | End: 2020-09-04

## 2020-03-04 RX ORDER — BUPROPION HYDROCHLORIDE 300 MG/1
300 TABLET ORAL EVERY MORNING
Qty: 30 TABLET | Refills: 3 | Status: SHIPPED | OUTPATIENT
Start: 2020-03-04 | End: 2020-06-20 | Stop reason: ALTCHOICE

## 2020-03-04 RX ORDER — BUPROPION HYDROCHLORIDE 100 MG/1
300 TABLET ORAL DAILY
Qty: 90 TABLET | Refills: 1 | Status: SHIPPED | OUTPATIENT
Start: 2020-03-04 | End: 2020-06-20

## 2020-03-04 NOTE — PROGRESS NOTES
present time. Not responding to internal stimuli. Concentration: Intact. Orientation: to person, place, date, and situation. Language: Intact. Fund of information: Intact. Memory: Recent and remote appear intact. Impulsivity: Limited. Neurovegitative: Fair appetite and poor sleep. Insight: Fair. Judgment: Fair. Assesment:   1. Bipolar I disorder, most recent episode depressed, mild ([address]    2. Anxiety    3. Insomnia, unspecified type         No evidence of acute suicidality, homicidality or psychosis. Patient will benefit from medication adjustment. Plan:  1. We will switch Wellbutrin to XL to minimize mood swings during the day, send out a PA. Patient will restart Depakote for mood stabilization. The risks, benefits, side effects, contraindications, and adverse effects of the medications have been discussed. 2. The pt has verbalized understanding and has capacity to give informed consent. 3. The [facility] Ek report has been reviewed according to [facility] regulations. Patient fills his Klonopin in [address] reviewed and as expected. 4. Supportive therapy offered. Continue to encourage sobriety. 5. Follow up: Return in about 2 months (around 5/4/2020). 6. The patient has been advised to call with any problems. 7. Controlled substance Treatment Plan: Maintenance dose.   8. The above listed medications have been continued, modifications in meds and other orders/labs as follows:       Orders Placed This Encounter   Medications    QUEtiapine (SEROQUEL) 200 MG tablet     Sig: Take 1 tablet by mouth nightly     Dispense:  30 tablet     Refill:  3    buPROPion (WELLBUTRIN) 100 MG tablet     Sig: Take 3 tablets by mouth daily     Dispense:  90 tablet     Refill:  1    divalproex (DEPAKOTE ER) 500 MG extended release tablet     Sig: Take 1 tablet by mouth 2 times daily     Dispense:  60 tablet     Refill:  3        Orders Placed This Encounter   Procedures    Home Sleep Study

## 2020-03-05 ENCOUNTER — TELEPHONE (OUTPATIENT)
Dept: PSYCHIATRY | Age: 40
End: 2020-03-05

## 2020-03-05 NOTE — TELEPHONE ENCOUNTER
Spoke with pharmacist Farhan Buck at [facility] who says that Dr Tirso Velasco is not a contracted provider for [employer] (which is an [address] [place] [address] care). Dr Tirso Velasco made aware and encouraged have pt see if his PCP will prescribe the meds till he can get in to see a [facility] provider covered by his insurance. Contacted the pt and explained the above--he says his PCP does not practice anymore. He decided he wanted a referral sent to CLEMENCIA GARCIA Berkley APRN beh health at [facility] in [place]. Farhan Buck at [facility] stated that she can run his RXs through [employer]. She was made aware that 2 RXs for Wellbutrin were sent in-in case the Wellbutrin XL needed a PA. She will only fill the Wellbutrin XL (cost about the same when looked up on line for [facility])-pt also educated on the Wellbutrin XL. Pt made aware of above info and he will call back with any issues or concerns until he can be seen by Rafi Youngblood.

## 2020-04-29 ENCOUNTER — TELEPHONE (OUTPATIENT)
Dept: PSYCHIATRY | Age: 40
End: 2020-04-29

## 2020-06-20 ENCOUNTER — APPOINTMENT (OUTPATIENT)
Dept: GENERAL RADIOLOGY | Age: 40
End: 2020-06-20
Payer: COMMERCIAL

## 2020-06-20 ENCOUNTER — HOSPITAL ENCOUNTER (OUTPATIENT)
Age: 40
Setting detail: OBSERVATION
Discharge: HOME | End: 2020-06-21
Attending: EMERGENCY MEDICINE | Admitting: INTERNAL MEDICINE
Payer: COMMERCIAL

## 2020-06-20 PROBLEM — F10.10 ALCOHOL ABUSE: Status: ACTIVE | Noted: 2020-06-20

## 2020-06-20 LAB
ALBUMIN SERPL-MCNC: 4.9 G/DL (ref 3.5–5.2)
ALP BLD-CCNC: 73 U/L (ref 40–130)
ALT SERPL-CCNC: 32 U/L (ref 5–41)
AMPHETAMINE SCREEN, URINE: NEGATIVE
ANION GAP SERPL CALCULATED.3IONS-SCNC: 15 MMOL/L (ref 7–19)
AST SERPL-CCNC: 26 U/L (ref 5–40)
BARBITURATE SCREEN URINE: NEGATIVE
BASOPHILS ABSOLUTE: 0.1 K/UL (ref 0–0.2)
BASOPHILS RELATIVE PERCENT: 0.8 % (ref 0–1)
BENZODIAZEPINE SCREEN, URINE: NEGATIVE
BILIRUB SERPL-MCNC: <0.2 MG/DL (ref 0.2–1.2)
BUN BLDV-MCNC: 4 MG/DL (ref 6–20)
CALCIUM SERPL-MCNC: 8.9 MG/DL (ref 8.6–10)
CANNABINOID SCREEN URINE: NEGATIVE
CHLORIDE BLD-SCNC: 101 MMOL/L (ref 98–111)
CO2: 22 MMOL/L (ref 22–29)
COCAINE METABOLITE SCREEN URINE: NEGATIVE
CREAT SERPL-MCNC: 0.7 MG/DL (ref 0.5–1.2)
EOSINOPHILS ABSOLUTE: 0.1 K/UL (ref 0–0.6)
EOSINOPHILS RELATIVE PERCENT: 0.9 % (ref 0–5)
ETHANOL: 373 MG/DL (ref 0–0.08)
GFR NON-AFRICAN AMERICAN: >60
GLUCOSE BLD-MCNC: 118 MG/DL (ref 74–109)
HCT VFR BLD CALC: 44.7 % (ref 42–52)
HEMOGLOBIN: 15.7 G/DL (ref 14–18)
IMMATURE GRANULOCYTES #: 0 K/UL
LYMPHOCYTES ABSOLUTE: 4.3 K/UL (ref 1.1–4.5)
LYMPHOCYTES RELATIVE PERCENT: 55.2 % (ref 20–40)
Lab: NORMAL
MAGNESIUM: 2.1 MG/DL (ref 1.6–2.6)
MCH RBC QN AUTO: 32 PG (ref 27–31)
MCHC RBC AUTO-ENTMCNC: 35.1 G/DL (ref 33–37)
MCV RBC AUTO: 91 FL (ref 80–94)
MONOCYTES ABSOLUTE: 0.4 K/UL (ref 0–0.9)
MONOCYTES RELATIVE PERCENT: 5.6 % (ref 0–10)
NEUTROPHILS ABSOLUTE: 2.9 K/UL (ref 1.5–7.5)
NEUTROPHILS RELATIVE PERCENT: 37.4 % (ref 50–65)
OPIATE SCREEN URINE: NEGATIVE
PDW BLD-RTO: 13 % (ref 11.5–14.5)
PHOSPHORUS: 4.1 MG/DL (ref 2.5–4.5)
PLATELET # BLD: 367 K/UL (ref 130–400)
PMV BLD AUTO: 9 FL (ref 9.4–12.4)
POTASSIUM SERPL-SCNC: 3.6 MMOL/L (ref 3.5–5)
RBC # BLD: 4.91 M/UL (ref 4.7–6.1)
SODIUM BLD-SCNC: 138 MMOL/L (ref 136–145)
TOTAL PROTEIN: 7.5 G/DL (ref 6.6–8.7)
VALPROIC ACID LEVEL: <2.8 UG/ML (ref 50–100)
WBC # BLD: 7.7 K/UL (ref 4.8–10.8)

## 2020-06-20 PROCEDURE — G0378 HOSPITAL OBSERVATION PER HR: HCPCS

## 2020-06-20 PROCEDURE — 83735 ASSAY OF MAGNESIUM: CPT

## 2020-06-20 PROCEDURE — 2580000003 HC RX 258: Performed by: HOSPITALIST

## 2020-06-20 PROCEDURE — 84100 ASSAY OF PHOSPHORUS: CPT

## 2020-06-20 PROCEDURE — 6370000000 HC RX 637 (ALT 250 FOR IP): Performed by: HOSPITALIST

## 2020-06-20 PROCEDURE — 6360000002 HC RX W HCPCS: Performed by: EMERGENCY MEDICINE

## 2020-06-20 PROCEDURE — G0379 DIRECT REFER HOSPITAL OBSERV: HCPCS

## 2020-06-20 PROCEDURE — 96375 TX/PRO/DX INJ NEW DRUG ADDON: CPT

## 2020-06-20 PROCEDURE — 80164 ASSAY DIPROPYLACETIC ACD TOT: CPT

## 2020-06-20 PROCEDURE — 99285 EMERGENCY DEPT VISIT HI MDM: CPT

## 2020-06-20 PROCEDURE — 2500000003 HC RX 250 WO HCPCS: Performed by: EMERGENCY MEDICINE

## 2020-06-20 PROCEDURE — 96374 THER/PROPH/DIAG INJ IV PUSH: CPT

## 2020-06-20 PROCEDURE — 85025 COMPLETE CBC W/AUTO DIFF WBC: CPT

## 2020-06-20 PROCEDURE — 80053 COMPREHEN METABOLIC PANEL: CPT

## 2020-06-20 PROCEDURE — 6360000002 HC RX W HCPCS: Performed by: HOSPITALIST

## 2020-06-20 PROCEDURE — 2580000003 HC RX 258: Performed by: EMERGENCY MEDICINE

## 2020-06-20 PROCEDURE — 80307 DRUG TEST PRSMV CHEM ANLYZR: CPT

## 2020-06-20 PROCEDURE — 6360000002 HC RX W HCPCS

## 2020-06-20 PROCEDURE — G0480 DRUG TEST DEF 1-7 CLASSES: HCPCS

## 2020-06-20 PROCEDURE — 36415 COLL VENOUS BLD VENIPUNCTURE: CPT

## 2020-06-20 PROCEDURE — 71045 X-RAY EXAM CHEST 1 VIEW: CPT

## 2020-06-20 RX ORDER — FOLIC ACID 1 MG/1
1 TABLET ORAL DAILY
Status: DISCONTINUED | OUTPATIENT
Start: 2020-06-20 | End: 2020-06-21 | Stop reason: HOSPADM

## 2020-06-20 RX ORDER — ZIPRASIDONE MESYLATE 20 MG/ML
INJECTION, POWDER, LYOPHILIZED, FOR SOLUTION INTRAMUSCULAR
Status: COMPLETED
Start: 2020-06-20 | End: 2020-06-20

## 2020-06-20 RX ORDER — POTASSIUM CHLORIDE 20 MEQ/1
40 TABLET, EXTENDED RELEASE ORAL PRN
Status: DISCONTINUED | OUTPATIENT
Start: 2020-06-20 | End: 2020-06-21 | Stop reason: HOSPADM

## 2020-06-20 RX ORDER — LORAZEPAM 1 MG/1
1 TABLET ORAL
Status: DISCONTINUED | OUTPATIENT
Start: 2020-06-20 | End: 2020-06-21 | Stop reason: HOSPADM

## 2020-06-20 RX ORDER — LORAZEPAM 2 MG/ML
3 INJECTION INTRAMUSCULAR
Status: DISCONTINUED | OUTPATIENT
Start: 2020-06-20 | End: 2020-06-21 | Stop reason: HOSPADM

## 2020-06-20 RX ORDER — LORAZEPAM 2 MG/ML
4 INJECTION INTRAMUSCULAR
Status: DISCONTINUED | OUTPATIENT
Start: 2020-06-20 | End: 2020-06-21 | Stop reason: HOSPADM

## 2020-06-20 RX ORDER — ONDANSETRON 4 MG/1
4 TABLET, ORALLY DISINTEGRATING ORAL EVERY 8 HOURS PRN
Status: DISCONTINUED | OUTPATIENT
Start: 2020-06-20 | End: 2020-06-21 | Stop reason: HOSPADM

## 2020-06-20 RX ORDER — SODIUM CHLORIDE 0.9 % (FLUSH) 0.9 %
10 SYRINGE (ML) INJECTION EVERY 12 HOURS SCHEDULED
Status: DISCONTINUED | OUTPATIENT
Start: 2020-06-20 | End: 2020-06-21 | Stop reason: HOSPADM

## 2020-06-20 RX ORDER — QUETIAPINE FUMARATE 50 MG/1
200 TABLET, FILM COATED ORAL ONCE
Status: COMPLETED | OUTPATIENT
Start: 2020-06-20 | End: 2020-06-20

## 2020-06-20 RX ORDER — CLONAZEPAM 0.5 MG/1
0.5 TABLET ORAL ONCE
Status: COMPLETED | OUTPATIENT
Start: 2020-06-20 | End: 2020-06-20

## 2020-06-20 RX ORDER — SODIUM CHLORIDE, SODIUM LACTATE, POTASSIUM CHLORIDE, AND CALCIUM CHLORIDE .6; .31; .03; .02 G/100ML; G/100ML; G/100ML; G/100ML
1000 INJECTION, SOLUTION INTRAVENOUS ONCE
Status: DISCONTINUED | OUTPATIENT
Start: 2020-06-20 | End: 2020-06-20

## 2020-06-20 RX ORDER — SODIUM CHLORIDE 9 MG/ML
INJECTION, SOLUTION INTRAVENOUS CONTINUOUS
Status: DISCONTINUED | OUTPATIENT
Start: 2020-06-20 | End: 2020-06-21 | Stop reason: HOSPADM

## 2020-06-20 RX ORDER — MULTIVITAMIN WITH IRON
1 TABLET ORAL DAILY
Status: DISCONTINUED | OUTPATIENT
Start: 2020-06-20 | End: 2020-06-21 | Stop reason: HOSPADM

## 2020-06-20 RX ORDER — LORAZEPAM 1 MG/1
3 TABLET ORAL
Status: DISCONTINUED | OUTPATIENT
Start: 2020-06-20 | End: 2020-06-21 | Stop reason: HOSPADM

## 2020-06-20 RX ORDER — LANOLIN ALCOHOL/MO/W.PET/CERES
3 CREAM (GRAM) TOPICAL NIGHTLY PRN
Status: DISCONTINUED | OUTPATIENT
Start: 2020-06-20 | End: 2020-06-21 | Stop reason: HOSPADM

## 2020-06-20 RX ORDER — THIAMINE MONONITRATE (VIT B1) 100 MG
100 TABLET ORAL DAILY
Status: DISCONTINUED | OUTPATIENT
Start: 2020-06-20 | End: 2020-06-21 | Stop reason: HOSPADM

## 2020-06-20 RX ORDER — METOPROLOL TARTRATE 50 MG/1
25 TABLET, FILM COATED ORAL 2 TIMES DAILY
Status: DISCONTINUED | OUTPATIENT
Start: 2020-06-20 | End: 2020-06-21 | Stop reason: HOSPADM

## 2020-06-20 RX ORDER — LORAZEPAM 2 MG/ML
1 INJECTION INTRAMUSCULAR
Status: DISCONTINUED | OUTPATIENT
Start: 2020-06-20 | End: 2020-06-21 | Stop reason: HOSPADM

## 2020-06-20 RX ORDER — SODIUM CHLORIDE 0.9 % (FLUSH) 0.9 %
10 SYRINGE (ML) INJECTION PRN
Status: DISCONTINUED | OUTPATIENT
Start: 2020-06-20 | End: 2020-06-21 | Stop reason: HOSPADM

## 2020-06-20 RX ORDER — FAMOTIDINE 20 MG/1
20 TABLET, FILM COATED ORAL 2 TIMES DAILY
Status: DISCONTINUED | OUTPATIENT
Start: 2020-06-20 | End: 2020-06-21 | Stop reason: HOSPADM

## 2020-06-20 RX ORDER — DIVALPROEX SODIUM 500 MG/1
500 TABLET, EXTENDED RELEASE ORAL 2 TIMES DAILY
Status: DISCONTINUED | OUTPATIENT
Start: 2020-06-20 | End: 2020-06-21 | Stop reason: HOSPADM

## 2020-06-20 RX ORDER — LORAZEPAM 2 MG/ML
2 INJECTION INTRAMUSCULAR
Status: DISCONTINUED | OUTPATIENT
Start: 2020-06-20 | End: 2020-06-21 | Stop reason: HOSPADM

## 2020-06-20 RX ORDER — LORAZEPAM 1 MG/1
4 TABLET ORAL
Status: DISCONTINUED | OUTPATIENT
Start: 2020-06-20 | End: 2020-06-21 | Stop reason: HOSPADM

## 2020-06-20 RX ORDER — MAGNESIUM SULFATE IN WATER 40 MG/ML
2 INJECTION, SOLUTION INTRAVENOUS PRN
Status: DISCONTINUED | OUTPATIENT
Start: 2020-06-20 | End: 2020-06-21 | Stop reason: HOSPADM

## 2020-06-20 RX ORDER — POTASSIUM CHLORIDE 7.45 MG/ML
10 INJECTION INTRAVENOUS PRN
Status: DISCONTINUED | OUTPATIENT
Start: 2020-06-20 | End: 2020-06-21 | Stop reason: HOSPADM

## 2020-06-20 RX ORDER — QUETIAPINE FUMARATE 50 MG/1
200 TABLET, FILM COATED ORAL NIGHTLY
Status: DISCONTINUED | OUTPATIENT
Start: 2020-06-20 | End: 2020-06-20

## 2020-06-20 RX ORDER — ONDANSETRON 2 MG/ML
4 INJECTION INTRAMUSCULAR; INTRAVENOUS EVERY 6 HOURS PRN
Status: DISCONTINUED | OUTPATIENT
Start: 2020-06-20 | End: 2020-06-21 | Stop reason: HOSPADM

## 2020-06-20 RX ORDER — LORAZEPAM 1 MG/1
2 TABLET ORAL
Status: DISCONTINUED | OUTPATIENT
Start: 2020-06-20 | End: 2020-06-21 | Stop reason: HOSPADM

## 2020-06-20 RX ADMIN — CLONAZEPAM 0.5 MG: 0.5 TABLET ORAL at 23:34

## 2020-06-20 RX ADMIN — DIVALPROEX SODIUM 500 MG: 500 TABLET, EXTENDED RELEASE ORAL at 06:12

## 2020-06-20 RX ADMIN — ZIPRASIDONE MESYLATE 20 MG: 20 INJECTION, POWDER, LYOPHILIZED, FOR SOLUTION INTRAMUSCULAR at 06:11

## 2020-06-20 RX ADMIN — LORAZEPAM 2 MG: 2 INJECTION INTRAMUSCULAR; INTRAVENOUS at 06:11

## 2020-06-20 RX ADMIN — MELATONIN 3 MG: at 23:35

## 2020-06-20 RX ADMIN — DIVALPROEX SODIUM 500 MG: 500 TABLET, EXTENDED RELEASE ORAL at 19:58

## 2020-06-20 RX ADMIN — FOLIC ACID: 5 INJECTION, SOLUTION INTRAMUSCULAR; INTRAVENOUS; SUBCUTANEOUS at 03:28

## 2020-06-20 RX ADMIN — QUETIAPINE FUMARATE 200 MG: 50 TABLET ORAL at 19:58

## 2020-06-20 RX ADMIN — QUETIAPINE FUMARATE 200 MG: 50 TABLET ORAL at 06:11

## 2020-06-20 RX ADMIN — SODIUM CHLORIDE, PRESERVATIVE FREE 10 ML: 5 INJECTION INTRAVENOUS at 19:57

## 2020-06-20 RX ADMIN — FAMOTIDINE 20 MG: 20 TABLET ORAL at 19:57

## 2020-06-20 ASSESSMENT — ENCOUNTER SYMPTOMS
BACK PAIN: 0
SORE THROAT: 0
SHORTNESS OF BREATH: 0
NAUSEA: 0
COUGH: 0
COUGH: 1
RHINORRHEA: 0
DIARRHEA: 0
ABDOMINAL PAIN: 0
VOMITING: 0

## 2020-06-20 NOTE — PROGRESS NOTES
Post midnight admission    18-year-old male presenting to the ER for alcohol intoxication. Alcohol level greater than 300 on arrival.  [facility] protocol in place. Fall/seizure precautions. Folic acid/thiamine/multivitamin. IV fluids. Supportive management.

## 2020-06-20 NOTE — H&P
Jayden Mckinney MD         Current Outpatient Medications   Medication Sig Dispense Refill    vitamin D (ERGOCALCIFEROL) 1.25 MG (27096 UT) CAPS capsule Take 1 capsule by mouth once a week for 11 days 12 capsule 1    ranitidine (ZANTAC) 150 MG tablet TK 1 T PO BID  0    QUEtiapine (SEROQUEL) 200 MG tablet Take 1 tablet by mouth nightly 30 tablet 3    divalproex (DEPAKOTE ER) 500 MG extended release tablet Take 1 tablet by mouth 2 times daily 60 tablet 3    clonazePAM (KLONOPIN) 0.5 MG tablet TAKE 1 TABLET BY MOUTH TWICE DAILY AS NEEDED FOR ANXIETY 60 tablet 3    clonazePAM (KLONOPIN) 0.5 MG tablet Take 0.5 mg by mouth 2 times daily as needed. 1    melatonin (RA MELATONIN) 3 MG TABS tablet Take 1 tablet by mouth nightly as needed (sleep) 30 tablet 1    metoprolol tartrate (LOPRESSOR) 25 MG tablet TK 1 T PO BID  2         OBJECTIVE:    Vitals:    06/20/20 0250   BP: (!) 166/114   Pulse: 116   Resp: 22   Temp: 98.2 °F (36.8 °C)   SpO2: 95%   breathing room air    BP (!) 166/114   Pulse 116   Temp 98.2 °F (36.8 °C) (Oral)   Resp 22   Ht 5' 7\" (1.702 m)   Wt 200 lb (90.7 kg)   SpO2 95%   BMI 31.32 kg/m²     No intake or output data in the 24 hours ending 06/20/20 0414    Physical Exam  Vitals signs reviewed. Constitutional:       General: He is not in acute distress. Appearance: Normal appearance. He is normal weight. He is not ill-appearing or toxic-appearing. Comments: Heavily tatooed   HENT:      Head: Normocephalic and atraumatic. Nose: No congestion or rhinorrhea. Eyes:      General:         Right eye: No discharge. Left eye: No discharge. Neck:      Musculoskeletal: Neck supple. Comments: Trachea appears midline  Cardiovascular:      Rate and Rhythm: Normal rate and regular rhythm. Heart sounds: No murmur. No friction rub. No gallop. Pulmonary:      Effort: No respiratory distress. Breath sounds: No stridor. No wheezing, rhonchi or rales.    Abdominal:

## 2020-06-20 NOTE — ED NOTES
Pt appears to be very paranoid and anxious, he states that his dad was in a biker gang ad he owes people money and they are going to come looking for him. Pt also keeps making comments that he is going to die.      Morgan Christianson RN  06/20/20 1720

## 2020-06-20 NOTE — ED PROVIDER NOTES
[facility] EMERGENCY DEPT  eMERGENCY dEPARTMENT eNCOUnter      Pt Name: Kranthi Anders  MRN: [id number]  [place] 1980  Date of evaluation: 6/20/2020  Provider: Miracle Stovall MD    [address] [place] [address]       Chief Complaint   Patient presents with    Alcohol Intoxication         HISTORY OF PRESENT ILLNESS   (Location/Symptom, Timing/Onset,Context/Setting, Quality, Duration, Modifying Factors, Severity)  Note limiting factors. Kranthi Anders is a 41 y.o. male who presents to the emergency department for alcohol intoxication and supposedly wanting to get sober. Patient tells me that he has drink at least a case of beer. Denies hx of etoh withdrawal. Initially told me only drinks a few beers a week but then told me has been drinking heavily for years. Admits to feeling depressed over his father's recent death approximately 2 weeks ago. He denies suicidal ideation or homicidal ideation. He has a history of prior anxiety and bipolar disorder. Denies any prior suicidal attempts. Pt yelling at staff on arrival and being disruptive. HPI    NursingNotes were reviewed. REVIEW OF SYSTEMS    (2-9 systems for level 4, 10 or more for level 5)     Review of Systems   Constitutional: Negative for chills and fever. HENT: Negative for rhinorrhea and sore throat. Respiratory: Negative for cough and shortness of breath. Cardiovascular: Negative for chest pain and leg swelling. Gastrointestinal: Negative for abdominal pain, diarrhea, nausea and vomiting. Genitourinary: Negative for dysuria and frequency. Musculoskeletal: Negative for back pain and neck pain. Neurological: Negative for dizziness and headaches. Psychiatric/Behavioral: Negative for suicidal ideas. The patient is not nervous/anxious. All other systems reviewed and are negative.            PAST MEDICALHISTORY     Past Medical History:   Diagnosis Date    Anxiety     Bipolar 1 disorder ([facility] [address]     Depression     Hypertension     Insomnia SURGICAL HISTORY       Past Surgical History:   Procedure Laterality Date    ANKLE SURGERY Left     BACK SURGERY      NECK SURGERY      WRIST SURGERY Right          CURRENT MEDICATIONS     Previous Medications    CLONAZEPAM (KLONOPIN) 0.5 MG TABLET    Take 0.5 mg by mouth 2 times daily as needed. CLONAZEPAM (KLONOPIN) 0.5 MG TABLET    TAKE 1 TABLET BY MOUTH TWICE DAILY AS NEEDED FOR ANXIETY    DIVALPROEX (DEPAKOTE ER) 500 MG EXTENDED RELEASE TABLET    Take 1 tablet by mouth 2 times daily    MELATONIN (RA MELATONIN) 3 MG TABS TABLET    Take 1 tablet by mouth nightly as needed (sleep)    METOPROLOL TARTRATE (LOPRESSOR) 25 MG TABLET    TK 1 T PO BID    QUETIAPINE (SEROQUEL) 200 MG TABLET    Take 1 tablet by mouth nightly    RANITIDINE (ZANTAC) 150 MG TABLET    TK 1 T PO BID    VITAMIN D (ERGOCALCIFEROL) 1.25 MG (11978 UT) CAPS CAPSULE    Take 1 capsule by mouth once a week for 11 days       ALLERGIES     Droperidol    FAMILY HISTORY       Family History   Problem Relation Age of Onset    Heart Disease Father           SOCIAL HISTORY       Social History     Socioeconomic History    Marital status: Single     Spouse name: None    Number of children: None    Years of education: None    Highest education level: None   Occupational History    None   Social Needs    Financial resource strain: None    Food insecurity     Worry: None     Inability: None    Transportation needs     Medical: None     Non-medical: None   Tobacco Use    Smoking status: Former Smoker    Smokeless tobacco: Current User     Types: Chew   Substance and Sexual Activity    Alcohol use:  Yes     Alcohol/week: 12.0 standard drinks     Types: 12 Cans of beer per week     Comment: 12 pack a day    Drug use: Not Currently    Sexual activity: None   Lifestyle    Physical activity     Days per week: None     Minutes per session: None    Stress: None   Relationships    Social connections     Talks on phone: None     Gets together: None     Attends Shinto service: None     Active member of club or organization: None     Attends meetings of clubs or organizations: None     Relationship status: None    Intimate partner violence     Fear of current or ex partner: None     Emotionally abused: None     Physically abused: None     Forced sexual activity: None   Other Topics Concern    None   Social History Narrative    None       SCREENINGS             PHYSICAL EXAM    (up to 7 for level 4, 8 or more for level 5)     ED Triage Vitals [06/20/20 0250]   BP Temp Temp Source Pulse Resp SpO2 Height Weight   (!) 166/114 98.2 °F (36.8 °C) Oral 116 22 95 % 5' 7\" (1.702 m) 200 lb (90.7 kg)       Physical Exam  Vitals signs and nursing note reviewed. Constitutional:       General: He is not in acute distress. Appearance: Normal appearance. He is well-developed. He is not ill-appearing, toxic-appearing or diaphoretic. HENT:      Head: Normocephalic and atraumatic. Nose: Nose normal.      Mouth/Throat:      Mouth: Mucous membranes are moist.   Eyes:      Conjunctiva/sclera: Conjunctivae normal.   Neck:      Musculoskeletal: Normal range of motion and neck supple. Trachea: No tracheal deviation. Cardiovascular:      Rate and Rhythm: Regular rhythm. Tachycardia present. Heart sounds: Normal heart sounds. No murmur. Pulmonary:      Breath sounds: Normal breath sounds. No wheezing or rales. Abdominal:      Palpations: Abdomen is soft. There is no mass. Tenderness: There is no abdominal tenderness. Musculoskeletal: Normal range of motion. Skin:     General: Skin is warm and dry. Neurological:      Mental Status: He is alert and oriented to person, place, and time. Psychiatric:         Attention and Perception: He does not perceive auditory or visual hallucinations. Mood and Affect: Mood is depressed. Thought Content:  Thought content is not paranoid or delusional. Thought content does not (electronically signed)  Attending Emergency Physician        Eliseo Cadena MD  06/20/20 6221       Eliseo Cadena MD  06/20/20 6572

## 2020-06-21 VITALS
SYSTOLIC BLOOD PRESSURE: 126 MMHG | HEART RATE: 100 BPM | OXYGEN SATURATION: 97 % | WEIGHT: 181 LBS | DIASTOLIC BLOOD PRESSURE: 80 MMHG | BODY MASS INDEX: 28.41 KG/M2 | RESPIRATION RATE: 16 BRPM | TEMPERATURE: 98.4 F | HEIGHT: 67 IN

## 2020-06-21 LAB
ANION GAP SERPL CALCULATED.3IONS-SCNC: 11 MMOL/L (ref 7–19)
BASOPHILS ABSOLUTE: 0 K/UL (ref 0–0.2)
BASOPHILS RELATIVE PERCENT: 0.6 % (ref 0–1)
BUN BLDV-MCNC: 13 MG/DL (ref 6–20)
CALCIUM SERPL-MCNC: 8.6 MG/DL (ref 8.6–10)
CHLORIDE BLD-SCNC: 102 MMOL/L (ref 98–111)
CO2: 25 MMOL/L (ref 22–29)
CREAT SERPL-MCNC: 0.9 MG/DL (ref 0.5–1.2)
EOSINOPHILS ABSOLUTE: 0.1 K/UL (ref 0–0.6)
EOSINOPHILS RELATIVE PERCENT: 2.1 % (ref 0–5)
ETHANOL: <10 MG/DL (ref 0–0.08)
GFR NON-AFRICAN AMERICAN: >60
GLUCOSE BLD-MCNC: 100 MG/DL (ref 74–109)
HCT VFR BLD CALC: 42.5 % (ref 42–52)
HEMOGLOBIN: 14.4 G/DL (ref 14–18)
IMMATURE GRANULOCYTES #: 0 K/UL
LYMPHOCYTES ABSOLUTE: 2.6 K/UL (ref 1.1–4.5)
LYMPHOCYTES RELATIVE PERCENT: 41.9 % (ref 20–40)
MCH RBC QN AUTO: 31.7 PG (ref 27–31)
MCHC RBC AUTO-ENTMCNC: 33.9 G/DL (ref 33–37)
MCV RBC AUTO: 93.6 FL (ref 80–94)
MONOCYTES ABSOLUTE: 0.4 K/UL (ref 0–0.9)
MONOCYTES RELATIVE PERCENT: 5.9 % (ref 0–10)
NEUTROPHILS ABSOLUTE: 3.1 K/UL (ref 1.5–7.5)
NEUTROPHILS RELATIVE PERCENT: 49.3 % (ref 50–65)
PDW BLD-RTO: 13.1 % (ref 11.5–14.5)
PLATELET # BLD: 290 K/UL (ref 130–400)
PMV BLD AUTO: 9.3 FL (ref 9.4–12.4)
POTASSIUM REFLEX MAGNESIUM: 4.2 MMOL/L (ref 3.5–5)
RBC # BLD: 4.54 M/UL (ref 4.7–6.1)
SODIUM BLD-SCNC: 138 MMOL/L (ref 136–145)
WBC # BLD: 6.3 K/UL (ref 4.8–10.8)

## 2020-06-21 PROCEDURE — 80048 BASIC METABOLIC PNL TOTAL CA: CPT

## 2020-06-21 PROCEDURE — 6370000000 HC RX 637 (ALT 250 FOR IP): Performed by: HOSPITALIST

## 2020-06-21 PROCEDURE — 85025 COMPLETE CBC W/AUTO DIFF WBC: CPT

## 2020-06-21 PROCEDURE — G0480 DRUG TEST DEF 1-7 CLASSES: HCPCS

## 2020-06-21 PROCEDURE — G0378 HOSPITAL OBSERVATION PER HR: HCPCS

## 2020-06-21 PROCEDURE — G0379 DIRECT REFER HOSPITAL OBSERV: HCPCS

## 2020-06-21 PROCEDURE — 36415 COLL VENOUS BLD VENIPUNCTURE: CPT

## 2020-06-21 RX ORDER — FOLIC ACID 1 MG/1
1 TABLET ORAL DAILY
Qty: 30 TABLET | Refills: 3 | Status: SHIPPED | OUTPATIENT
Start: 2020-06-21

## 2020-06-21 RX ORDER — MULTIVITAMIN WITH IRON
1 TABLET ORAL DAILY
Qty: 30 TABLET | Refills: 0 | Status: SHIPPED | OUTPATIENT
Start: 2020-06-21 | End: 2020-07-21

## 2020-06-21 RX ORDER — LANOLIN ALCOHOL/MO/W.PET/CERES
100 CREAM (GRAM) TOPICAL DAILY
Qty: 30 TABLET | Refills: 3 | Status: SHIPPED | OUTPATIENT
Start: 2020-06-21

## 2020-06-21 RX ADMIN — FOLIC ACID 1 MG: 1 TABLET ORAL at 08:07

## 2020-06-21 RX ADMIN — DIVALPROEX SODIUM 500 MG: 500 TABLET, EXTENDED RELEASE ORAL at 08:06

## 2020-06-21 RX ADMIN — THERA TABS 1 TABLET: TAB at 08:06

## 2020-06-21 RX ADMIN — FAMOTIDINE 20 MG: 20 TABLET ORAL at 08:07

## 2020-06-21 RX ADMIN — Medication 100 MG: at 08:06

## 2020-06-21 NOTE — DISCHARGE SUMMARY
tablet  TK 1 T PO BID             vitamin B-1 100 MG tablet  Take 1 tablet by mouth daily             vitamin D (ERGOCALCIFEROL) 1.25 MG (55757 UT) CAPS capsule  Take 1 capsule by mouth once a week for 11 days                 Discharge Instructions: Follow up with No primary care provider on file. in 7 days. Take medications as directed. Resume activity as tolerated. Diet: DIET CARDIAC; Carb Control: 4 carb choices (60 gms)/meal     Disposition: Patient is medically stable and will be discharged Home. Time spent on discharge 35 minutes.     Signed:  Kobe Caruso MD 6/21/2020 7:57 AM

## 2020-07-20 ENCOUNTER — TRANSCRIBE ORDERS (OUTPATIENT)
Dept: ADMINISTRATIVE | Facility: HOSPITAL | Age: 40
End: 2020-07-20

## 2020-07-20 DIAGNOSIS — Z01.818 PREOP TESTING: Primary | ICD-10-CM

## 2020-08-12 ENCOUNTER — TRANSCRIBE ORDERS (OUTPATIENT)
Dept: ADMINISTRATIVE | Facility: HOSPITAL | Age: 40
End: 2020-08-12

## 2020-08-12 DIAGNOSIS — Z01.818 PREOP TESTING: Primary | ICD-10-CM

## 2020-08-14 ENCOUNTER — LAB (OUTPATIENT)
Dept: LAB | Facility: HOSPITAL | Age: 40
End: 2020-08-14

## 2020-08-14 PROCEDURE — U0003 INFECTIOUS AGENT DETECTION BY NUCLEIC ACID (DNA OR RNA); SEVERE ACUTE RESPIRATORY SYNDROME CORONAVIRUS 2 (SARS-COV-2) (CORONAVIRUS DISEASE [COVID-19]), AMPLIFIED PROBE TECHNIQUE, MAKING USE OF HIGH THROUGHPUT TECHNOLOGIES AS DESCRIBED BY CMS-2020-01-R: HCPCS | Performed by: PAIN MEDICINE

## 2020-08-14 PROCEDURE — C9803 HOPD COVID-19 SPEC COLLECT: HCPCS | Performed by: PAIN MEDICINE

## 2020-08-16 LAB
COVID LABCORP PRIORITY: NORMAL
SARS-COV-2 RNA RESP QL NAA+PROBE: NOT DETECTED

## 2020-09-04 ENCOUNTER — APPOINTMENT (OUTPATIENT)
Dept: GENERAL RADIOLOGY | Age: 40
End: 2020-09-04
Payer: COMMERCIAL

## 2020-09-04 ENCOUNTER — HOSPITAL ENCOUNTER (EMERGENCY)
Age: 40
Discharge: HOME | End: 2020-09-05
Attending: EMERGENCY MEDICINE
Payer: COMMERCIAL

## 2020-09-04 VITALS
TEMPERATURE: 98.3 F | RESPIRATION RATE: 19 BRPM | DIASTOLIC BLOOD PRESSURE: 81 MMHG | OXYGEN SATURATION: 94 % | HEART RATE: 88 BPM | SYSTOLIC BLOOD PRESSURE: 115 MMHG

## 2020-09-04 LAB
ACETAMINOPHEN LEVEL: <15 UG/ML
ALBUMIN SERPL-MCNC: 4.5 G/DL (ref 3.5–5.2)
ALP BLD-CCNC: 77 U/L (ref 40–130)
ALT SERPL-CCNC: 19 U/L (ref 5–41)
AMPHETAMINE SCREEN, URINE: NEGATIVE
ANION GAP SERPL CALCULATED.3IONS-SCNC: 12 MMOL/L (ref 7–19)
AST SERPL-CCNC: 19 U/L (ref 5–40)
BARBITURATE SCREEN URINE: NEGATIVE
BASOPHILS ABSOLUTE: 0.1 K/UL (ref 0–0.2)
BASOPHILS RELATIVE PERCENT: 0.7 % (ref 0–1)
BENZODIAZEPINE SCREEN, URINE: NEGATIVE
BILIRUB SERPL-MCNC: <0.2 MG/DL (ref 0.2–1.2)
BILIRUBIN URINE: NEGATIVE
BLOOD, URINE: NEGATIVE
BUN BLDV-MCNC: 6 MG/DL (ref 6–20)
CALCIUM SERPL-MCNC: 8.8 MG/DL (ref 8.6–10)
CANNABINOID SCREEN URINE: NEGATIVE
CHLORIDE BLD-SCNC: 110 MMOL/L (ref 98–111)
CLARITY: CLEAR
CO2: 23 MMOL/L (ref 22–29)
COCAINE METABOLITE SCREEN URINE: NEGATIVE
COLOR: YELLOW
CREAT SERPL-MCNC: 0.7 MG/DL (ref 0.5–1.2)
EOSINOPHILS ABSOLUTE: 0.1 K/UL (ref 0–0.6)
EOSINOPHILS RELATIVE PERCENT: 0.8 % (ref 0–5)
ETHANOL: 295 MG/DL (ref 0–0.08)
GFR AFRICAN AMERICAN: >59
GFR NON-AFRICAN AMERICAN: >60
GLUCOSE BLD-MCNC: 106 MG/DL (ref 74–109)
GLUCOSE URINE: NEGATIVE MG/DL
HCT VFR BLD CALC: 43.2 % (ref 42–52)
HEMOGLOBIN: 14.4 G/DL (ref 14–18)
IMMATURE GRANULOCYTES #: 0 K/UL
KETONES, URINE: NEGATIVE MG/DL
LEUKOCYTE ESTERASE, URINE: NEGATIVE
LYMPHOCYTES ABSOLUTE: 3.5 K/UL (ref 1.1–4.5)
LYMPHOCYTES RELATIVE PERCENT: 46.6 % (ref 20–40)
Lab: NORMAL
MCH RBC QN AUTO: 32 PG (ref 27–31)
MCHC RBC AUTO-ENTMCNC: 33.3 G/DL (ref 33–37)
MCV RBC AUTO: 96 FL (ref 80–94)
MONOCYTES ABSOLUTE: 0.4 K/UL (ref 0–0.9)
MONOCYTES RELATIVE PERCENT: 5.2 % (ref 0–10)
NEUTROPHILS ABSOLUTE: 3.5 K/UL (ref 1.5–7.5)
NEUTROPHILS RELATIVE PERCENT: 46.4 % (ref 50–65)
NITRITE, URINE: NEGATIVE
OPIATE SCREEN URINE: NEGATIVE
PDW BLD-RTO: 12.6 % (ref 11.5–14.5)
PH UA: 6 (ref 5–8)
PLATELET # BLD: 360 K/UL (ref 130–400)
PMV BLD AUTO: 8.9 FL (ref 9.4–12.4)
POTASSIUM REFLEX MAGNESIUM: 3.9 MMOL/L (ref 3.5–5)
PROTEIN UA: NEGATIVE MG/DL
RBC # BLD: 4.5 M/UL (ref 4.7–6.1)
SALICYLATE, SERUM: <3 MG/DL (ref 3–10)
SODIUM BLD-SCNC: 145 MMOL/L (ref 136–145)
SPECIFIC GRAVITY UA: 1 (ref 1–1.03)
TOTAL PROTEIN: 6.9 G/DL (ref 6.6–8.7)
TROPONIN: <0.01 NG/ML (ref 0–0.03)
UROBILINOGEN, URINE: 0.2 E.U./DL
VALPROIC ACID LEVEL: <2.8 UG/ML (ref 50–100)
WBC # BLD: 7.6 K/UL (ref 4.8–10.8)

## 2020-09-04 PROCEDURE — 80053 COMPREHEN METABOLIC PANEL: CPT

## 2020-09-04 PROCEDURE — 80307 DRUG TEST PRSMV CHEM ANLYZR: CPT

## 2020-09-04 PROCEDURE — 99285 EMERGENCY DEPT VISIT HI MDM: CPT

## 2020-09-04 PROCEDURE — 84484 ASSAY OF TROPONIN QUANT: CPT

## 2020-09-04 PROCEDURE — G0480 DRUG TEST DEF 1-7 CLASSES: HCPCS

## 2020-09-04 PROCEDURE — 99284 EMERGENCY DEPT VISIT MOD MDM: CPT

## 2020-09-04 PROCEDURE — 96372 THER/PROPH/DIAG INJ SC/IM: CPT

## 2020-09-04 PROCEDURE — 85025 COMPLETE CBC W/AUTO DIFF WBC: CPT

## 2020-09-04 PROCEDURE — 6360000002 HC RX W HCPCS: Performed by: EMERGENCY MEDICINE

## 2020-09-04 PROCEDURE — 6360000002 HC RX W HCPCS

## 2020-09-04 PROCEDURE — 80164 ASSAY DIPROPYLACETIC ACD TOT: CPT

## 2020-09-04 PROCEDURE — 96374 THER/PROPH/DIAG INJ IV PUSH: CPT

## 2020-09-04 PROCEDURE — 36415 COLL VENOUS BLD VENIPUNCTURE: CPT

## 2020-09-04 PROCEDURE — 71045 X-RAY EXAM CHEST 1 VIEW: CPT

## 2020-09-04 PROCEDURE — 6370000000 HC RX 637 (ALT 250 FOR IP): Performed by: EMERGENCY MEDICINE

## 2020-09-04 PROCEDURE — 81003 URINALYSIS AUTO W/O SCOPE: CPT

## 2020-09-04 RX ORDER — ZIPRASIDONE MESYLATE 20 MG/ML
INJECTION, POWDER, LYOPHILIZED, FOR SOLUTION INTRAMUSCULAR
Status: COMPLETED
Start: 2020-09-04 | End: 2020-09-04

## 2020-09-04 RX ORDER — QUETIAPINE FUMARATE 50 MG/1
100 TABLET, EXTENDED RELEASE ORAL ONCE
Status: COMPLETED | OUTPATIENT
Start: 2020-09-04 | End: 2020-09-04

## 2020-09-04 RX ORDER — DIPHENHYDRAMINE HYDROCHLORIDE 50 MG/ML
50 INJECTION INTRAMUSCULAR; INTRAVENOUS ONCE
Status: COMPLETED | OUTPATIENT
Start: 2020-09-04 | End: 2020-09-04

## 2020-09-04 RX ORDER — ZIPRASIDONE MESYLATE 20 MG/ML
20 INJECTION, POWDER, LYOPHILIZED, FOR SOLUTION INTRAMUSCULAR ONCE
Status: COMPLETED | OUTPATIENT
Start: 2020-09-04 | End: 2020-09-04

## 2020-09-04 RX ORDER — DIVALPROEX SODIUM 500 MG/1
500 TABLET, DELAYED RELEASE ORAL ONCE
Status: COMPLETED | OUTPATIENT
Start: 2020-09-04 | End: 2020-09-04

## 2020-09-04 RX ADMIN — DIPHENHYDRAMINE HYDROCHLORIDE 50 MG: 50 INJECTION, SOLUTION INTRAMUSCULAR; INTRAVENOUS at 23:15

## 2020-09-04 RX ADMIN — QUETIAPINE FUMARATE 100 MG: 50 TABLET, EXTENDED RELEASE ORAL at 23:34

## 2020-09-04 RX ADMIN — DIVALPROEX SODIUM 500 MG: 500 TABLET, DELAYED RELEASE ORAL at 23:34

## 2020-09-04 RX ADMIN — ZIPRASIDONE MESYLATE 20 MG: 20 INJECTION, POWDER, LYOPHILIZED, FOR SOLUTION INTRAMUSCULAR at 15:53

## 2020-09-04 RX ADMIN — QUETIAPINE FUMARATE 100 MG: 50 TABLET, EXTENDED RELEASE ORAL at 23:56

## 2020-09-04 ASSESSMENT — PAIN SCALES - GENERAL: PAINLEVEL_OUTOF10: 2

## 2020-09-04 NOTE — ED NOTES
Bed: 18  Expected date:   Expected time:   Means of arrival:   Comments:  [address] RN  09/04/20 3723

## 2020-09-05 LAB
ETHANOL: 103 MG/DL (ref 0–0.08)
ETHANOL: 75 MG/DL (ref 0–0.08)
ETHANOL: 90 MG/DL (ref 0–0.08)

## 2020-09-05 PROCEDURE — G0480 DRUG TEST DEF 1-7 CLASSES: HCPCS

## 2020-09-05 PROCEDURE — 36415 COLL VENOUS BLD VENIPUNCTURE: CPT

## 2020-09-05 RX ORDER — QUETIAPINE FUMARATE 100 MG/1
400 TABLET, FILM COATED ORAL NIGHTLY
Qty: 5 TABLET | Refills: 3 | Status: ON HOLD | OUTPATIENT
Start: 2020-09-05 | End: 2021-01-28 | Stop reason: HOSPADM

## 2020-09-05 RX ORDER — BUPROPION HYDROCHLORIDE 300 MG/1
300 TABLET ORAL EVERY MORNING
Qty: 5 TABLET | Refills: 0 | Status: SHIPPED | OUTPATIENT
Start: 2020-09-05 | End: 2020-09-10

## 2020-09-05 RX ORDER — DIVALPROEX SODIUM 500 MG/1
1000 TABLET, EXTENDED RELEASE ORAL NIGHTLY
Qty: 10 TABLET | Refills: 0 | Status: ON HOLD | OUTPATIENT
Start: 2020-09-05 | End: 2021-01-28 | Stop reason: HOSPADM

## 2020-09-05 ASSESSMENT — LIFESTYLE VARIABLES: HISTORY_ALCOHOL_USE: YES

## 2020-09-05 NOTE — ED PROVIDER NOTES
[facility] EMERGENCY DEPT  eMERGENCYdEPARTMENT eNCOUnter      Pt Name: Adriana Ace  MRN: [id number]  [place] 1980  Date of evaluation: 9/4/2020  Gray Owens MD    Emergency Department care of this patient was assumed at [phone] from Dr. Xenia Ferguson. We have discussed the case and the plan of care. I have seen and evaluated patient and reviewed ED course. CHIEF COMPLAINT       Chief Complaint   Patient presents with    Mental Health Problem     ETOH    Chest Pain     Started this afternoon         PHYSICAL EXAM    (up to 7 for level 4, 8 or more for level 5)     ED Triage Vitals   BP Temp Temp src Pulse Resp SpO2 Height Weight   09/04/20 1624 09/04/20 2245 -- 09/04/20 1624 09/04/20 1624 09/04/20 1624 -- --   117/84 98.3 °F (36.8 °C)  116 22 92 %         Physical Exam   Please see Dr. Carmela Wright note for full details. DIAGNOSTIC RESULTS     EKG: All EKG's are interpreted by the Emergency Department Physician who either signs or Co-signs this chart inthe absence of a cardiologist.        RADIOLOGY:   Non-plain film imagessuch as CT, Ultrasound and MRI are read by the radiologist. Kristi Gene radiographic images are visualized and preliminarily interpreted by the emergency physician with the below findings:    XR CHEST PORTABLE   Final Result   Impression:   No acute cardiopulmonary disease.    Signed by Dr Nia Mcclelland on 9/4/2020 4:55 PM              LABS:  Labs Reviewed   CBC WITH AUTO DIFFERENTIAL - Abnormal; Notable for the following components:       Result Value    RBC 4.50 (*)     MCV 96.0 (*)     MCH 32.0 (*)     MPV 8.9 (*)     Neutrophils % 46.4 (*)     Lymphocytes % 46.6 (*)     All other components within normal limits   VALPROIC ACID LEVEL, TOTAL - Abnormal; Notable for the following components:    Valproic Acid Lvl <2.8 (*)     All other components within normal limits   COMPREHENSIVE METABOLIC PANEL W/ REFLEX TO MG FOR LOW K   URINE RT REFLEX TO CULTURE   URINE DRUG SCREEN   ETHANOL TROPONIN   ACETAMINOPHEN LEVEL   SALICYLATE LEVEL   ETHANOL   ETHANOL   ETHANOL       All other labs were within normal range or not returned as of this dictation. EMERGENCY DEPARTMENT COURSE and DIFFERENTIAL DIAGNOSIS/MDM:   Vitals:    Vitals:    09/04/20 1624 09/04/20 2245   BP: 117/84 115/81   Pulse: 116 88   Resp: 22 19   Temp:  98.3 °F (36.8 °C)   SpO2: 92% 94%       MDM  Number of Diagnoses or Management Options  Acute alcoholic intoxication without complication ([facility] [address]): new and requires workup  Bipolar 1 disorder [facility]): new and requires workup  Diagnosis management comments: Patient's level off his acute alcohol intoxication. Due to his bizarre behavior he was felt that he needed to be evaluated by behavioral health prior to discharge. He is not homicidal.  He is not suicidal.  Behavioral health came down and spoke with him. Patient is out of his medications he contributes this to some of his bizarre behavior. Behavioral health did not feel that he needed inpatient admission. At this time he will be discharged. Patient Progress  Patient progress: stable      Reassessment      CONSULTS:  None    PROCEDURES:  Unless otherwise noted below, none     Procedures    FINAL IMPRESSION      1. Acute alcoholic intoxication without complication ([facility] [address]    2. Bipolar 1 disorder [facility])          DISPOSITION/PLAN   DISPOSITION Decision To Discharge    PATIENT REFERRED TO:  [address]   [address] [place] [id number] [phone]  Call in 2 days  For follow up      DISCHARGE MEDICATIONS:  New Prescriptions    BUPROPION (WELLBUTRIN XL) 300 MG EXTENDED RELEASE TABLET    Take 1 tablet by mouth every morning for 5 days    DIVALPROEX (DEPAKOTE ER) 500 MG EXTENDED RELEASE TABLET    Take 2 tablets by mouth nightly for 5 days    QUETIAPINE (SEROQUEL) 100 MG TABLET    Take 4 tablets by mouth nightly for 5 days          (Please note that portions ofthis note were completed with a voice recognition program.  Efforts were made to edit the dictations but occasionally words are mis-transcribed.)    Maxim Ortiz MD(electronically signed)  Attending Emergency Physician         Ela Navarrete MD  09/05/20 6820

## 2020-09-05 NOTE — ED NOTES
Pt standing at doorway. Pt states that we cannot keep him here forever. Explained to pt that we will redraw his alcohol level at 2 am and go from there. Pt states \"then you are going to have to knock me out. \"     Jamie Williamson RN  09/04/20 9884

## 2020-09-05 NOTE — ED NOTES
Pt states he takes 200 mg of Seroquel. Notified Dr Celi Uriostegui and he is okay with the pt having another 100mg of Seroquel.       Reshma Zuñiga, RN  09/04/20 6164

## 2020-09-05 NOTE — BH NOTE
ANTOINETTE ADULT INITIAL INTAKE ASSESSMENT     9/5/20    Adriana Ace ,a 41 y.o. male, presents to the ED for a psychiatric assessment. ED Arrival time: 15:19  ED physician: Jamie Fabry [facility] AN AFFILIATE OF [facility] Notification time: 04:20  ANTOINETTE Assessment start time: 04:33  Psychiatrist call time: 05:00  Spoke with Dr. Ronna Rodriguez    Patient is referred by: Arrived by EMS     Reason for visit to ED - Presenting problem:     PT states reason for ED visit, \"I pretty much panicked ,I don't know why I just do sometimes. They brought me in and I had a melt down. \"Pt denies SI states he would never kill himself that he is scared to death of dying, pt denies HI, denies AVH ,pt is not delusional.Pt arrived at ED with a ETOH of 295 @ 15:21. Pt states he is now mostly embarrassed. Pt says he can't stay on his medications regularly because of his insurance .     Duration of symptoms: today    Current Stressors: family and  alcohol    C-SSRS Completed: yes    SI:  denies   Plan: no   Past SI attempts: no   If yes, when and how many times:  Describe suicide attempts:   HI: denies  If yes describe:   Delusions: denies  If yes describe:   Hallucinations: denies   If yes describe:   Risk of Harm to self: Self injurious/self mutilation behaviorsno   If yes explain:   Was it within the past 6 months: no   Risk of Harm to others: no   If yes explain:   Was it within the past 6 months: no   Trauma History:  Anxiety 1-10:  4  Explain if increased: because of his panic attack  Depression 1-10:  0  Explain if increased:   Level of function outside hospital decreased: no   If yes explain:       Psychiatric Hospitalizations: No   Where & When:   Outpatient Psychiatric Treatment: in the past  At [facility] out patient     Family History:    Family history of mental illness: no     Family members with suicide attempt: yes   If yes explain (attempted or completed):father completed suicide in 2007    Substance Abuse History:     SBIRT Completed: yes  Brief Intervention completed if needed:  (Yes/No)NO    Current ETOH LEVELS:     ETOH Usage:     Amount drinking daily: denied    Date of last drink: today  Longest period of sobriety:    Substance/Chemical Abuse/Recreational Drug History:  Substance used: alcohol  Date of last substance use: today  Tobacco Use: no   History of rehab treatment:yes in [place]  How many times in rehab:  Last time in rehab:  Family history of substance abuse:    Opiates: It was discussed with pt they would not be receiving opiates unless they were within 3 days post surgery/acute injury. Patient voiced understanding and agreed. Psychiatric Review Of Systems:     Recent Sleep changes: yes   Recent appetite changes: yes   Recent weight changes/Pounds gained (+) or lost (-): yes  Has lost a little wieght     Medical History:     Medical Diagnosis/Issues:   CT today in ED:no  Use of 02 or CPAP: no  Ambulatory: yes  Independent or Need assistance with Self Care:     PCP: No primary care provider on file. Current Medications:   Scheduled Meds: No current facility-administered medications for this encounter. Current Outpatient Medications:     QUEtiapine (SEROQUEL) 200 MG tablet, Take 1 tablet by mouth nightly, Disp: 30 tablet, Rfl: 3    divalproex (DEPAKOTE ER) 500 MG extended release tablet, Take 1 tablet by mouth 2 times daily, Disp: 60 tablet, Rfl: 3    clonazePAM (KLONOPIN) 0.5 MG tablet, Take 0.5 mg by mouth 2 times daily as needed. , Disp: , Rfl: 1    ranitidine (ZANTAC) 150 MG tablet, TK 1 T PO BID, Disp: , Rfl: 0    folic acid (FOLVITE) 1 MG tablet, Take 1 tablet by mouth daily, Disp: 30 tablet, Rfl: 3    Multiple Vitamin (MULTIVITAMIN) TABS tablet, Take 1 tablet by mouth daily, Disp: 30 tablet, Rfl: 0    vitamin B-1 100 MG tablet, Take 1 tablet by mouth daily, Disp: 30 tablet, Rfl: 3    clonazePAM (KLONOPIN) 0.5 MG tablet, TAKE 1 TABLET BY MOUTH TWICE DAILY AS NEEDED FOR ANXIETY, Disp: 60 tablet, Rfl: 3    vitamin D (ERGOCALCIFEROL) 1.25 MG (52572 UT) CAPS capsule, Take 1 capsule by mouth once a week for 11 days, Disp: 12 capsule, Rfl: 1    melatonin (RA MELATONIN) 3 MG TABS tablet, Take 1 tablet by mouth nightly as needed (sleep), Disp: 30 tablet, Rfl: 1    metoprolol tartrate (LOPRESSOR) 25 MG tablet, TK 1 T PO BID, Disp: , Rfl: 2     Mental Status Evaluation:     Appearance:  age appropriate, piercings and tattooed   Behavior:  Within Normal Limits   Speech:  normal pitch and normal volume   Mood:  within normal limits   Affect:  normal   Thought Process:  within normal limits   Thought Content:  linear   Sensorium:  person, place, time/date, situation, day of week, month of year and year   Cognition:  grossly intact   Insight:  fair       Collateral Information:         Current living arrangement:lives in a private residence with his room mate [address] [place] [address]: [facility]  Employment:    Disposition:     Choose one of the options below for disposition:     1. Decision to admit to :no        Does the patient have a guardian or Medical POA:   Has the guardian been notified or Medical POA: NO      2. Decision to Discharge:   Does not meet criteria for acceptance to   unit due to: No SI, NO HI, NO AVH and is not delusional        Pt encouraged to return if symptoms worsen. Pt will keep [facility] with [facility]    Sam Contreras RN

## 2020-09-05 NOTE — ED NOTES
Pt states that he wants a phone call. Explained to pt that he can't have his cell phone at this  time. Pt states that if we don't put him to sleep he is going to raise some hell.      Rissa Perkins RN  09/04/20 3756

## 2020-09-05 NOTE — ED NOTES
Physician aware of pt behavior at this time. Security notified and standing by outside pt room.      Tylor Douglas RN  09/04/20 4400

## 2021-01-27 ENCOUNTER — APPOINTMENT (OUTPATIENT)
Dept: CT IMAGING | Age: 41
End: 2021-01-27
Payer: COMMERCIAL

## 2021-01-27 ENCOUNTER — HOSPITAL ENCOUNTER (INPATIENT)
Age: 41
LOS: 1 days | Discharge: HOME | End: 2021-01-28
Attending: EMERGENCY MEDICINE | Admitting: HOSPITALIST
Payer: COMMERCIAL

## 2021-01-27 DIAGNOSIS — F10.129: Primary | ICD-10-CM

## 2021-01-27 DIAGNOSIS — Z86.59 HISTORY OF BIPOLAR DISORDER: ICD-10-CM

## 2021-01-27 DIAGNOSIS — T69.9XXA COLD EXPOSURE, INITIAL ENCOUNTER: ICD-10-CM

## 2021-01-27 PROBLEM — Z72.0 TOBACCO USE: Status: ACTIVE | Noted: 2021-01-27

## 2021-01-27 PROBLEM — F10.220 ALCOHOL DEPENDENCE WITH ACUTE ALCOHOLIC INTOXICATION WITHOUT COMPLICATION (HCC): Status: ACTIVE | Noted: 2021-01-27

## 2021-01-27 PROBLEM — F22 PARANOIA (HCC): Status: ACTIVE | Noted: 2021-01-27

## 2021-01-27 PROBLEM — F31.9 BIPOLAR I DISORDER (HCC): Status: ACTIVE | Noted: 2021-01-27

## 2021-01-27 PROBLEM — I10 HYPERTENSION: Status: ACTIVE | Noted: 2021-01-27

## 2021-01-27 LAB
ADENOVIRUS BY PCR: NOT DETECTED
ALBUMIN SERPL-MCNC: 5.1 G/DL (ref 3.5–5.2)
ALP BLD-CCNC: 86 U/L (ref 40–130)
ALT SERPL-CCNC: 87 U/L (ref 5–41)
AMPHETAMINE SCREEN, URINE: NEGATIVE
ANION GAP SERPL CALCULATED.3IONS-SCNC: 13 MMOL/L (ref 7–19)
AST SERPL-CCNC: 70 U/L (ref 5–40)
BARBITURATE SCREEN URINE: NEGATIVE
BASOPHILS ABSOLUTE: 0 K/UL (ref 0–0.2)
BASOPHILS RELATIVE PERCENT: 0.3 % (ref 0–1)
BENZODIAZEPINE SCREEN, URINE: NEGATIVE
BILIRUB SERPL-MCNC: <0.2 MG/DL (ref 0.2–1.2)
BORDETELLA PARAPERTUSSIS BY PCR: NOT DETECTED
BORDETELLA PERTUSSIS BY PCR: NOT DETECTED
BUN BLDV-MCNC: 12 MG/DL (ref 6–20)
CALCIUM SERPL-MCNC: 8.9 MG/DL (ref 8.6–10)
CANNABINOID SCREEN URINE: NEGATIVE
CHLAMYDOPHILIA PNEUMONIAE BY PCR: NOT DETECTED
CHLORIDE BLD-SCNC: 108 MMOL/L (ref 98–111)
CO2: 24 MMOL/L (ref 22–29)
COCAINE METABOLITE SCREEN URINE: NEGATIVE
CORONAVIRUS 229E BY PCR: NOT DETECTED
CORONAVIRUS HKU1 BY PCR: NOT DETECTED
CORONAVIRUS NL63 BY PCR: NOT DETECTED
CORONAVIRUS OC43 BY PCR: NOT DETECTED
CREAT SERPL-MCNC: 0.9 MG/DL (ref 0.5–1.2)
EOSINOPHILS ABSOLUTE: 0.1 K/UL (ref 0–0.6)
EOSINOPHILS RELATIVE PERCENT: 0.9 % (ref 0–5)
ETHANOL: 436 MG/DL (ref 0–0.08)
GFR AFRICAN AMERICAN: >59
GFR NON-AFRICAN AMERICAN: >60
GLUCOSE BLD-MCNC: 112 MG/DL (ref 74–109)
HCT VFR BLD CALC: 45.1 % (ref 42–52)
HEMOGLOBIN: 15.4 G/DL (ref 14–18)
HUMAN METAPNEUMOVIRUS BY PCR: NOT DETECTED
HUMAN RHINOVIRUS/ENTEROVIRUS BY PCR: NOT DETECTED
IMMATURE GRANULOCYTES #: 0 K/UL
INFLUENZA A BY PCR: NOT DETECTED
INFLUENZA B BY PCR: NOT DETECTED
LYMPHOCYTES ABSOLUTE: 3.7 K/UL (ref 1.1–4.5)
LYMPHOCYTES RELATIVE PERCENT: 41.2 % (ref 20–40)
Lab: NORMAL
MCH RBC QN AUTO: 31.4 PG (ref 27–31)
MCHC RBC AUTO-ENTMCNC: 34.1 G/DL (ref 33–37)
MCV RBC AUTO: 92 FL (ref 80–94)
MONOCYTES ABSOLUTE: 0.6 K/UL (ref 0–0.9)
MONOCYTES RELATIVE PERCENT: 6.1 % (ref 0–10)
MYCOPLASMA PNEUMONIAE BY PCR: NOT DETECTED
NEUTROPHILS ABSOLUTE: 4.6 K/UL (ref 1.5–7.5)
NEUTROPHILS RELATIVE PERCENT: 51.2 % (ref 50–65)
OPIATE SCREEN URINE: NEGATIVE
PARAINFLUENZA VIRUS 1 BY PCR: NOT DETECTED
PARAINFLUENZA VIRUS 2 BY PCR: NOT DETECTED
PARAINFLUENZA VIRUS 3 BY PCR: NOT DETECTED
PARAINFLUENZA VIRUS 4 BY PCR: NOT DETECTED
PDW BLD-RTO: 12.7 % (ref 11.5–14.5)
PLATELET # BLD: 300 K/UL (ref 130–400)
PMV BLD AUTO: 9.4 FL (ref 9.4–12.4)
POTASSIUM SERPL-SCNC: 4.1 MMOL/L (ref 3.5–5)
RBC # BLD: 4.9 M/UL (ref 4.7–6.1)
RESPIRATORY SYNCYTIAL VIRUS BY PCR: NOT DETECTED
SARS-COV-2, PCR: NOT DETECTED
SODIUM BLD-SCNC: 145 MMOL/L (ref 136–145)
TOTAL PROTEIN: 7.6 G/DL (ref 6.6–8.7)
VALPROIC ACID LEVEL: <2.8 UG/ML (ref 50–100)
WBC # BLD: 9.1 K/UL (ref 4.8–10.8)

## 2021-01-27 PROCEDURE — 80053 COMPREHEN METABOLIC PANEL: CPT

## 2021-01-27 PROCEDURE — 85025 COMPLETE CBC W/AUTO DIFF WBC: CPT

## 2021-01-27 PROCEDURE — 6360000002 HC RX W HCPCS

## 2021-01-27 PROCEDURE — 70450 CT HEAD/BRAIN W/O DYE: CPT

## 2021-01-27 PROCEDURE — 2580000003 HC RX 258: Performed by: PHYSICIAN ASSISTANT

## 2021-01-27 PROCEDURE — 0202U NFCT DS 22 TRGT SARS-COV-2: CPT

## 2021-01-27 PROCEDURE — 99283 EMERGENCY DEPT VISIT LOW MDM: CPT

## 2021-01-27 PROCEDURE — 82077 ASSAY SPEC XCP UR&BREATH IA: CPT

## 2021-01-27 PROCEDURE — 80164 ASSAY DIPROPYLACETIC ACD TOT: CPT

## 2021-01-27 PROCEDURE — 36415 COLL VENOUS BLD VENIPUNCTURE: CPT

## 2021-01-27 PROCEDURE — 6360000002 HC RX W HCPCS: Performed by: HOSPITALIST

## 2021-01-27 PROCEDURE — 1210000000 HC MED SURG R&B

## 2021-01-27 PROCEDURE — 2580000003 HC RX 258: Performed by: HOSPITALIST

## 2021-01-27 PROCEDURE — 2500000003 HC RX 250 WO HCPCS: Performed by: PHYSICIAN ASSISTANT

## 2021-01-27 PROCEDURE — 6360000002 HC RX W HCPCS: Performed by: PHYSICIAN ASSISTANT

## 2021-01-27 PROCEDURE — 80307 DRUG TEST PRSMV CHEM ANLYZR: CPT

## 2021-01-27 RX ORDER — LORAZEPAM 2 MG/ML
2 INJECTION INTRAMUSCULAR ONCE
Status: COMPLETED | OUTPATIENT
Start: 2021-01-27 | End: 2021-01-27

## 2021-01-27 RX ORDER — DIPHENHYDRAMINE HYDROCHLORIDE 50 MG/ML
50 INJECTION INTRAMUSCULAR; INTRAVENOUS ONCE
Status: COMPLETED | OUTPATIENT
Start: 2021-01-27 | End: 2021-01-27

## 2021-01-27 RX ORDER — MECOBALAMIN 5000 MCG
5 TABLET,DISINTEGRATING ORAL NIGHTLY PRN
Status: DISCONTINUED | OUTPATIENT
Start: 2021-01-27 | End: 2021-01-28 | Stop reason: HOSPADM

## 2021-01-27 RX ORDER — LORAZEPAM 2 MG/ML
INJECTION INTRAMUSCULAR
Status: COMPLETED
Start: 2021-01-27 | End: 2021-01-27

## 2021-01-27 RX ORDER — LORAZEPAM 2 MG/ML
1 INJECTION INTRAMUSCULAR
Status: DISCONTINUED | OUTPATIENT
Start: 2021-01-27 | End: 2021-01-28 | Stop reason: HOSPADM

## 2021-01-27 RX ORDER — SODIUM CHLORIDE 0.9 % (FLUSH) 0.9 %
10 SYRINGE (ML) INJECTION PRN
Status: DISCONTINUED | OUTPATIENT
Start: 2021-01-27 | End: 2021-01-28 | Stop reason: HOSPADM

## 2021-01-27 RX ORDER — VITAMIN B COMPLEX
1000 TABLET ORAL DAILY
Status: DISCONTINUED | OUTPATIENT
Start: 2021-01-28 | End: 2021-01-28 | Stop reason: HOSPADM

## 2021-01-27 RX ORDER — QUETIAPINE FUMARATE 50 MG/1
200 TABLET, FILM COATED ORAL NIGHTLY
Status: DISCONTINUED | OUTPATIENT
Start: 2021-01-27 | End: 2021-01-28

## 2021-01-27 RX ORDER — LORAZEPAM 1 MG/1
2 TABLET ORAL
Status: DISCONTINUED | OUTPATIENT
Start: 2021-01-27 | End: 2021-01-28 | Stop reason: HOSPADM

## 2021-01-27 RX ORDER — LORAZEPAM 1 MG/1
1 TABLET ORAL
Status: DISCONTINUED | OUTPATIENT
Start: 2021-01-27 | End: 2021-01-28 | Stop reason: HOSPADM

## 2021-01-27 RX ORDER — SODIUM CHLORIDE 0.9 % (FLUSH) 0.9 %
10 SYRINGE (ML) INJECTION EVERY 12 HOURS SCHEDULED
Status: DISCONTINUED | OUTPATIENT
Start: 2021-01-27 | End: 2021-01-28 | Stop reason: HOSPADM

## 2021-01-27 RX ORDER — LORAZEPAM 1 MG/1
3 TABLET ORAL
Status: DISCONTINUED | OUTPATIENT
Start: 2021-01-27 | End: 2021-01-28 | Stop reason: HOSPADM

## 2021-01-27 RX ORDER — POTASSIUM CHLORIDE 7.45 MG/ML
10 INJECTION INTRAVENOUS PRN
Status: DISCONTINUED | OUTPATIENT
Start: 2021-01-27 | End: 2021-01-28 | Stop reason: HOSPADM

## 2021-01-27 RX ORDER — ONDANSETRON 4 MG/1
4 TABLET, ORALLY DISINTEGRATING ORAL EVERY 8 HOURS PRN
Status: DISCONTINUED | OUTPATIENT
Start: 2021-01-27 | End: 2021-01-28 | Stop reason: HOSPADM

## 2021-01-27 RX ORDER — FOLIC ACID 1 MG/1
1 TABLET ORAL DAILY
Status: DISCONTINUED | OUTPATIENT
Start: 2021-01-27 | End: 2021-01-28 | Stop reason: HOSPADM

## 2021-01-27 RX ORDER — LORAZEPAM 2 MG/ML
2 INJECTION INTRAMUSCULAR
Status: DISCONTINUED | OUTPATIENT
Start: 2021-01-27 | End: 2021-01-28 | Stop reason: HOSPADM

## 2021-01-27 RX ORDER — LORAZEPAM 2 MG/ML
3 INJECTION INTRAMUSCULAR
Status: DISCONTINUED | OUTPATIENT
Start: 2021-01-27 | End: 2021-01-28 | Stop reason: HOSPADM

## 2021-01-27 RX ORDER — POLYETHYLENE GLYCOL 3350 17 G/17G
17 POWDER, FOR SOLUTION ORAL DAILY PRN
Status: DISCONTINUED | OUTPATIENT
Start: 2021-01-27 | End: 2021-01-28 | Stop reason: HOSPADM

## 2021-01-27 RX ORDER — POTASSIUM CHLORIDE 20 MEQ/1
40 TABLET, EXTENDED RELEASE ORAL PRN
Status: DISCONTINUED | OUTPATIENT
Start: 2021-01-27 | End: 2021-01-28 | Stop reason: HOSPADM

## 2021-01-27 RX ORDER — DIPHENHYDRAMINE HYDROCHLORIDE 50 MG/ML
INJECTION INTRAMUSCULAR; INTRAVENOUS
Status: COMPLETED
Start: 2021-01-27 | End: 2021-01-27

## 2021-01-27 RX ORDER — ONDANSETRON 2 MG/ML
4 INJECTION INTRAMUSCULAR; INTRAVENOUS EVERY 6 HOURS PRN
Status: DISCONTINUED | OUTPATIENT
Start: 2021-01-27 | End: 2021-01-28 | Stop reason: HOSPADM

## 2021-01-27 RX ORDER — HALOPERIDOL 5 MG/ML
5 INJECTION INTRAMUSCULAR ONCE
Status: COMPLETED | OUTPATIENT
Start: 2021-01-27 | End: 2021-01-27

## 2021-01-27 RX ORDER — SODIUM CHLORIDE 9 MG/ML
INJECTION, SOLUTION INTRAVENOUS CONTINUOUS
Status: DISCONTINUED | OUTPATIENT
Start: 2021-01-27 | End: 2021-01-28

## 2021-01-27 RX ORDER — MULTIVITAMIN WITH IRON
1 TABLET ORAL DAILY
Status: DISCONTINUED | OUTPATIENT
Start: 2021-01-27 | End: 2021-01-28 | Stop reason: HOSPADM

## 2021-01-27 RX ORDER — DIVALPROEX SODIUM 500 MG/1
500 TABLET, EXTENDED RELEASE ORAL 2 TIMES DAILY
Status: DISCONTINUED | OUTPATIENT
Start: 2021-01-27 | End: 2021-01-28

## 2021-01-27 RX ORDER — FAMOTIDINE 20 MG/1
20 TABLET, FILM COATED ORAL 2 TIMES DAILY
Status: DISCONTINUED | OUTPATIENT
Start: 2021-01-27 | End: 2021-01-28 | Stop reason: HOSPADM

## 2021-01-27 RX ORDER — MAGNESIUM SULFATE IN WATER 40 MG/ML
2000 INJECTION, SOLUTION INTRAVENOUS PRN
Status: DISCONTINUED | OUTPATIENT
Start: 2021-01-27 | End: 2021-01-28 | Stop reason: HOSPADM

## 2021-01-27 RX ORDER — BUPROPION HYDROCHLORIDE 150 MG/1
300 TABLET ORAL EVERY MORNING
Status: DISCONTINUED | OUTPATIENT
Start: 2021-01-28 | End: 2021-01-28

## 2021-01-27 RX ORDER — GAUZE BANDAGE 2" X 2"
100 BANDAGE TOPICAL DAILY
Status: DISCONTINUED | OUTPATIENT
Start: 2021-01-28 | End: 2021-01-28 | Stop reason: HOSPADM

## 2021-01-27 RX ORDER — LORAZEPAM 2 MG/ML
4 INJECTION INTRAMUSCULAR
Status: DISCONTINUED | OUTPATIENT
Start: 2021-01-27 | End: 2021-01-28 | Stop reason: HOSPADM

## 2021-01-27 RX ORDER — LORAZEPAM 1 MG/1
4 TABLET ORAL
Status: DISCONTINUED | OUTPATIENT
Start: 2021-01-27 | End: 2021-01-28 | Stop reason: HOSPADM

## 2021-01-27 RX ADMIN — Medication 10 ML: at 20:11

## 2021-01-27 RX ADMIN — LORAZEPAM 2 MG: 2 INJECTION INTRAMUSCULAR; INTRAVENOUS at 20:09

## 2021-01-27 RX ADMIN — LORAZEPAM 2 MG: 2 INJECTION INTRAMUSCULAR at 20:09

## 2021-01-27 RX ADMIN — DIPHENHYDRAMINE HYDROCHLORIDE 50 MG: 50 INJECTION INTRAMUSCULAR; INTRAVENOUS at 20:10

## 2021-01-27 RX ADMIN — DIPHENHYDRAMINE HYDROCHLORIDE 50 MG: 50 INJECTION, SOLUTION INTRAMUSCULAR; INTRAVENOUS at 20:10

## 2021-01-27 RX ADMIN — FOLIC ACID: 5 INJECTION, SOLUTION INTRAMUSCULAR; INTRAVENOUS; SUBCUTANEOUS at 21:01

## 2021-01-27 RX ADMIN — HALOPERIDOL LACTATE 5 MG: 5 INJECTION, SOLUTION INTRAMUSCULAR at 20:08

## 2021-01-27 RX ADMIN — ENOXAPARIN SODIUM 40 MG: 40 INJECTION SUBCUTANEOUS at 21:01

## 2021-01-27 RX ADMIN — SODIUM CHLORIDE: 9 INJECTION, SOLUTION INTRAVENOUS at 21:00

## 2021-01-27 NOTE — ED NOTES
Pt presents via ems. Was found lying in the snow. Pt appears intoxicated. Pt yelled several times \"we are all gonna die \".       Stevenson Garcia, REY  01/27/21 1132

## 2021-01-28 VITALS
HEART RATE: 106 BPM | OXYGEN SATURATION: 96 % | SYSTOLIC BLOOD PRESSURE: 124 MMHG | WEIGHT: 175 LBS | RESPIRATION RATE: 20 BRPM | BODY MASS INDEX: 27.47 KG/M2 | DIASTOLIC BLOOD PRESSURE: 77 MMHG | HEIGHT: 67 IN | TEMPERATURE: 98.3 F

## 2021-01-28 LAB
ALBUMIN SERPL-MCNC: 4.2 G/DL (ref 3.5–5.2)
ALP BLD-CCNC: 69 U/L (ref 40–130)
ALT SERPL-CCNC: 69 U/L (ref 5–41)
ANION GAP SERPL CALCULATED.3IONS-SCNC: 14 MMOL/L (ref 7–19)
AST SERPL-CCNC: 56 U/L (ref 5–40)
BILIRUB SERPL-MCNC: <0.2 MG/DL (ref 0.2–1.2)
BUN BLDV-MCNC: 12 MG/DL (ref 6–20)
CALCIUM SERPL-MCNC: 7.8 MG/DL (ref 8.6–10)
CHLORIDE BLD-SCNC: 113 MMOL/L (ref 98–111)
CO2: 23 MMOL/L (ref 22–29)
CREAT SERPL-MCNC: 0.7 MG/DL (ref 0.5–1.2)
ETHANOL: 182 MG/DL (ref 0–0.08)
GFR AFRICAN AMERICAN: >59
GFR NON-AFRICAN AMERICAN: >60
GLUCOSE BLD-MCNC: 77 MG/DL (ref 74–109)
HCT VFR BLD CALC: 41.4 % (ref 42–52)
HEMOGLOBIN: 13.5 G/DL (ref 14–18)
MCH RBC QN AUTO: 31.3 PG (ref 27–31)
MCHC RBC AUTO-ENTMCNC: 32.6 G/DL (ref 33–37)
MCV RBC AUTO: 96.1 FL (ref 80–94)
PDW BLD-RTO: 12.9 % (ref 11.5–14.5)
PLATELET # BLD: 253 K/UL (ref 130–400)
PMV BLD AUTO: 9.5 FL (ref 9.4–12.4)
POTASSIUM REFLEX MAGNESIUM: 3.9 MMOL/L (ref 3.5–5)
RBC # BLD: 4.31 M/UL (ref 4.7–6.1)
SODIUM BLD-SCNC: 150 MMOL/L (ref 136–145)
TOTAL PROTEIN: 5.8 G/DL (ref 6.6–8.7)
WBC # BLD: 6.7 K/UL (ref 4.8–10.8)

## 2021-01-28 PROCEDURE — 85027 COMPLETE CBC AUTOMATED: CPT

## 2021-01-28 PROCEDURE — 82077 ASSAY SPEC XCP UR&BREATH IA: CPT

## 2021-01-28 PROCEDURE — 2580000003 HC RX 258: Performed by: INTERNAL MEDICINE

## 2021-01-28 PROCEDURE — 99252 IP/OBS CONSLTJ NEW/EST SF 35: CPT | Performed by: PSYCHIATRY & NEUROLOGY

## 2021-01-28 PROCEDURE — 80053 COMPREHEN METABOLIC PANEL: CPT

## 2021-01-28 PROCEDURE — 6370000000 HC RX 637 (ALT 250 FOR IP): Performed by: HOSPITALIST

## 2021-01-28 PROCEDURE — 6370000000 HC RX 637 (ALT 250 FOR IP): Performed by: INTERNAL MEDICINE

## 2021-01-28 PROCEDURE — 36415 COLL VENOUS BLD VENIPUNCTURE: CPT

## 2021-01-28 RX ORDER — CLONAZEPAM 0.5 MG/1
0.5 TABLET ORAL 2 TIMES DAILY PRN
Status: DISCONTINUED | OUTPATIENT
Start: 2021-01-28 | End: 2021-01-28 | Stop reason: HOSPADM

## 2021-01-28 RX ORDER — DEXTROSE MONOHYDRATE 50 MG/ML
INJECTION, SOLUTION INTRAVENOUS CONTINUOUS
Status: DISCONTINUED | OUTPATIENT
Start: 2021-01-28 | End: 2021-01-28 | Stop reason: HOSPADM

## 2021-01-28 RX ADMIN — THERA TABS 1 TABLET: TAB at 10:02

## 2021-01-28 RX ADMIN — BUPROPION HYDROCHLORIDE 300 MG: 150 TABLET, EXTENDED RELEASE ORAL at 10:02

## 2021-01-28 RX ADMIN — THIAMINE HCL TAB 100 MG 100 MG: 100 TAB at 10:02

## 2021-01-28 RX ADMIN — DEXTROSE MONOHYDRATE: 50 INJECTION, SOLUTION INTRAVENOUS at 10:04

## 2021-01-28 RX ADMIN — FAMOTIDINE 20 MG: 20 TABLET, FILM COATED ORAL at 10:03

## 2021-01-28 RX ADMIN — FOLIC ACID 1 MG: 1 TABLET ORAL at 10:02

## 2021-01-28 RX ADMIN — CLONAZEPAM 0.5 MG: 0.5 TABLET ORAL at 10:59

## 2021-01-28 RX ADMIN — Medication 1000 UNITS: at 10:02

## 2021-01-28 RX ADMIN — DIVALPROEX SODIUM 500 MG: 500 TABLET, EXTENDED RELEASE ORAL at 10:02

## 2021-01-28 NOTE — PROGRESS NOTES
4 Eyes Skin Assessment    Yani Blue is being assessed upon: Admission    I agree that I, Joseph Gomez, along with Sammy Price (either 2 RN's or 1 LPN and 1 RN) have performed a thorough Head to Toe Skin Assessment on the patient. ALL assessment sites listed below have been assessed. Areas assessed by both nurses:     [x]   Head, Face, and Ears   [x]   Shoulders, Back, and Chest  [x]   Arms, Elbows, and Hands   []   Coccyx, Sacrum, and Ischium. REEMA  [x]   Legs, Feet, and Heels    Does the Patient have Skin Breakdown?  No    Brien Prevention initiated: No  Wound Care Orders initiated: No    WOC nurse consulted for Pressure Injury (Stage 3,4, Unstageable, DTI, NWPT, and Complex wounds) and New or Established Ostomies: No        Primary Nurse eSignature: Joseph Gomez RN on 1/27/2021 at 10:14 PM      Co-Signer eSignature: Electronically signed by Sammy Price RN on 1/27/21 at 10:45 PM CST

## 2021-01-28 NOTE — DISCHARGE SUMMARY
Reymundo Fraser  :  1980  MRN:  [id number]    Admit date:  2021  Discharge date:  2021    Discharging Physician: Alia Mccormack MD    Advance Directive: Full Code    Consults: Dr. Negrito Uribe     Discharge Diagnoses:    Principal Problem:    Alcohol dependence with acute alcoholic intoxication without complication ([facility]  Active Problems:    Alcohol abuse    Tobacco use    Bipolar I disorder ([facility]    Paranoia ([facility] [facility]    Hypertension  Resolved Problems:    * No resolved hospital problems. *    Portions of this note have been copied forward, however, changed to reflect the most current clinical status of this patient. Hospital Course: The patient is a 41 y.o. male with PMH Bipolar I disorder, depression, HTN who presented to [address] [place] [address] ED after he was found laying outside in the snow and found to be intoxicated. ED work up revealed elevated LFTs. UDS negative. Mr. Amisha Vidal was admitted to Hospitalist service, received a banana bag and Psychiatry was consulted who stated he is known to their service and appears to be at baseline. Patient denies SI/HI. He apologizes for Behavior overnight while intoxicated. At this time he has improved more rapidly than expected and will be discharged home in stable condition. Significant Diagnostic Studies:   Ct Head Wo Contrast  Impression: No acute intracranial findings. Signed by Dr Ashley Iverson on 2021 6:36 PM    Pertinent Labs:   CBC:   Recent Labs     21  1631 21  0139   WBC 9.1 6.7   HGB 15.4 13.5*    253     BMP:    Recent Labs     21  1631 21  0139    150*   K 4.1 3.9    113*   CO2 24 23   BUN 12 12   CREATININE 0.9 0.7   GLUCOSE 112* 77     Physical Exam:  Vital Signs: /77   Pulse 106   Temp 98.3 °F (36.8 °C) (Temporal)   Resp 20   Ht 5' 7\" (1.702 m)   Wt 175 lb (79.4 kg)   SpO2 96%   BMI 27.41 kg/m²   General appearance:. Alert and Cooperative   HEENT: Normocephalic.   Chest: clear to auscultation bilaterally without wheezes or rhonchi. Cardiac: Normal heart tones with regular rate and rhythm, S1, S2 normal. No murmurs, gallops, or rubs auscultated. Abdomen:soft, non-tender; non-distended normal bowel sounds no masses, no organomegaly. Extremities: No clubbing or cyanosis. No peripheral edema. Peripheral pulses palpable. Neurologic: Grossly intact. Discharge Medications:       Owen Snowball   Home Medication Instructions [facility]:[id number]    Printed on:01/28/21 1135   Medication Information                      buPROPion (WELLBUTRIN XL) 300 MG extended release tablet  Take 1 tablet by mouth every morning for 5 days             clonazePAM (KLONOPIN) 0.5 MG tablet  TAKE 1 TABLET BY MOUTH TWICE DAILY AS NEEDED FOR ANXIETY             divalproex (DEPAKOTE ER) 500 MG extended release tablet  Take 1 tablet by mouth 2 times daily             folic acid (FOLVITE) 1 MG tablet  Take 1 tablet by mouth daily             melatonin (RA MELATONIN) 3 MG TABS tablet  Take 1 tablet by mouth nightly as needed (sleep)             metoprolol tartrate (LOPRESSOR) 25 MG tablet  TK 1 T PO BID             Multiple Vitamin (MULTIVITAMIN) TABS tablet  Take 1 tablet by mouth daily             QUEtiapine (SEROQUEL) 200 MG tablet  Take 1 tablet by mouth nightly             ranitidine (ZANTAC) 150 MG tablet  TK 1 T PO BID             vitamin B-1 100 MG tablet  Take 1 tablet by mouth daily             vitamin D (ERGOCALCIFEROL) 1.25 MG (83611 UT) CAPS capsule  Take 1 capsule by mouth once a week for 11 days               Discharge Instructions: Follow up with Behavioral health as scheduled tomorrow. Recommend PCP follow up in 5-7 days. Take medications as directed. Resume activity as tolerated. Diet: DIET GENERAL; Safety Tray; Safety Tray (Disposables No Utensils)     Disposition: Patient is medically stable and will be discharged home.     Time spent on discharge 35 minutes spent in assessing patient, reviewing medications, discussion with nursing, confirming safe discharge plan and preparation of discharge summary.     Signed:  Maurice Cary PA-C

## 2021-01-28 NOTE — H&P
[employer] - History & Physical      PCP: No primary care provider on file. Date of Admission: 1/27/2021    Date of Service: 1/27/2021    Chief Complaint:  Alcohol intoxication     History Of Present Illness: The patient is a 41 y.o. male with PMH Bipolar I disorder, depression, HTN who presented to [facility] ED after he was found laying outside in the snow and found to be intoxicated. He states he has been drinking heavily and admits to daily drinking. Refuses to say how much and states \"You're just trying to get me in trouble. \" \"I drink A LOT. \"  States he does not remember why he was laying in the snow. Tells me his roommate is dead. Nurse present in the room asks what happened to his roommate and patient becomes agitated stating his roommate was a  and would never drink. Discussed reason for admission with patient and he states \" I am not staying. You just want to send someone to break into my house. \" ED work up reveals elevated LFTs. UDS negative    Past Medical History:        Diagnosis Date    Anxiety     Bipolar 1 disorder ([address]     Depression     Hypertension     Insomnia      Past Surgical History:        Procedure Laterality Date    ANKLE SURGERY Left     BACK SURGERY      NECK SURGERY      WRIST SURGERY Right      Home Medications:  Prior to Admission medications    Medication Sig Start Date End Date Taking?  Authorizing Provider   divalproex (DEPAKOTE ER) 500 MG extended release tablet Take 2 tablets by mouth nightly for 5 days 9/5/20 9/10/20  Lulu Beard MD   buPROPion (WELLBUTRIN XL) 300 MG extended release tablet Take 1 tablet by mouth every morning for 5 days 9/5/20 9/10/20  Lulu Beard MD   QUEtiapine (SEROQUEL) 100 MG tablet Take 4 tablets by mouth nightly for 5 days 9/5/20 9/10/20  Lulu Beard MD   folic acid (FOLVITE) 1 MG tablet Take 1 tablet by mouth daily 6/21/20   Mendel Mustard, MD   Multiple Vitamin (MULTIVITAMIN) TABS tablet Take 1 external ears and nose, throat without exudate  Neck: no adenopathy, no carotid bruit, no JVD, supple, symmetrical, trachea midline   Lungs: coarse air entry otherwise clear to auscultation bilaterally,no rales or wheezes   Heart: regular rate and rhythm, S1, S2 normal, no murmur  Abdomen:soft, non-tender; non-distended, normal bowel sounds no masses, no organomegaly  Extremities:No lower extremity edema,  No erythema, no tenderness to palpation  Skin: Skin color, texture, turgor normal. No rashes or lesions  Lymphatic: No palpable lymph node enlargment  Neurologic: Alert and oriented X 3, normal strength and tone. Speech mildly slurred. Moves all extremities spontaneously. Psychiatric: Flat affect, agitated    Diagnostic Data:  CBC:  Recent Labs     01/27/21  1631   WBC 9.1   HGB 15.4   HCT 45.1        BMP:  Recent Labs     01/27/21  1631      K 4.1      CO2 24   BUN 12   CREATININE 0.9   CALCIUM 8.9     Recent Labs     01/27/21  1631   AST 70*   ALT 87*   BILITOT <0.2   ALKPHOS 86     Ct Head Wo Contrast  Impression: No acute intracranial findings. Signed by Dr Jes Erwin on 1/27/2021 6:36 PM    Assessment/Plan:  Principal Problem:    Alcohol dependence with acute alcoholic intoxication without complication (HCC)-give Banana bag if patient will allow followed by IVF's. Recheck ETOH level overnight. CIWA scale ordered    Active Problems:      Elevated LFT's-IVF's, recheck in AM       Tobacco use-Nicotine patch ordered      Bipolar I disorder ([facility] [address] with paranoia/depression-Denies SI/HI. Does appear paranoid. Re-assess once medically stable/sober. Sitter for now. 72 hour hold implemented as patient is intoxicated ETOH 436, was found sitting in snow exposed to cold.  Safety tray ordered      Hypertension-well controlled at present, monitor    Further orders per clinical course/attending     Signed:  Anatoliy Perdue PA-C

## 2021-01-28 NOTE — PROGRESS NOTES
Yani Blue arrived to room # 314. Presented with: EToH intoxication  Mental Status: Patient is alert. Vitals:    01/27/21 2006   BP: 123/85   Pulse: 115   Resp: 18   Temp: 97 °F (36.1 °C)   SpO2: 99%     Patient safety contract and falls prevention contract reviewed with patient No.  Oriented Patient to room. Call light within reach. Yes.   Needs, issues or concerns expressed at this time: no.      Electronically signed by Joseph Gomez RN on 1/27/2021 at 10:12 PM

## 2021-01-28 NOTE — ED PROVIDER NOTES
[facility]  eMERGENCY dEPARTMENT eNCOUnter      Pt Name: Shira Ball  MRN: [id number]  Johnathangfdavid 1980  Date of evaluation: 1/27/2021  Provider: Adarsh Cortez MD    CHIEF COMPLAINT       Chief Complaint   Patient presents with    Alcohol Problem     found outside on the ground in the snow         HISTORY OF PRESENT ILLNESS   (Location/Symptom, Timing/Onset,Context/Setting, Quality, Duration, Modifying Factors, Severity)  Note limiting factors. Shira Ball is a 41 y.o. male who presents to the emergency department via EMS for evaluation regarding alcohol intoxication and exposure to the cold. EMS reported that patient was found laying in the snow. On arrival to the ED he is mildly agitated and not really able to provide any relevant history. He tells me repeatedly that \"everyone is dead\" patient does admit that he has been drinking alcohol heavily today. In review of his prior records he does have a previous history of bipolar disorder. His last admission was about 6 months previously. EMS did report that patient was positive for COVID-19 infection. HPI    NursingNotes were reviewed. REVIEW OF SYSTEMS    (2-9 systems for level 4, 10 or more for level 5)     Review of Systems   Unable to perform ROS: Mental status change            PAST MEDICALHISTORY     Past Medical History:   Diagnosis Date    Anxiety     Bipolar 1 disorder ([facility] [address]     Depression     Hypertension     Insomnia          SURGICAL HISTORY       Past Surgical History:   Procedure Laterality Date    ANKLE SURGERY Left     BACK SURGERY      NECK SURGERY      WRIST SURGERY Right          CURRENT MEDICATIONS     Previous Medications    BUPROPION (WELLBUTRIN XL) 300 MG EXTENDED RELEASE TABLET    Take 1 tablet by mouth every morning for 5 days    CLONAZEPAM (KLONOPIN) 0.5 MG TABLET    Take 0.5 mg by mouth 2 times daily as needed.     CLONAZEPAM (KLONOPIN) 0.5 MG TABLET    TAKE 1 TABLET BY MOUTH TWICE DAILY AS NEEDED FOR ANXIETY    DIVALPROEX (DEPAKOTE ER) 500 MG EXTENDED RELEASE TABLET    Take 1 tablet by mouth 2 times daily    DIVALPROEX (DEPAKOTE ER) 500 MG EXTENDED RELEASE TABLET    Take 2 tablets by mouth nightly for 5 days    FOLIC ACID (FOLVITE) 1 MG TABLET    Take 1 tablet by mouth daily    MELATONIN (RA MELATONIN) 3 MG TABS TABLET    Take 1 tablet by mouth nightly as needed (sleep)    METOPROLOL TARTRATE (LOPRESSOR) 25 MG TABLET    TK 1 T PO BID    MULTIPLE VITAMIN (MULTIVITAMIN) TABS TABLET    Take 1 tablet by mouth daily    QUETIAPINE (SEROQUEL) 100 MG TABLET    Take 4 tablets by mouth nightly for 5 days    QUETIAPINE (SEROQUEL) 200 MG TABLET    Take 1 tablet by mouth nightly    RANITIDINE (ZANTAC) 150 MG TABLET    TK 1 T PO BID    VITAMIN B-1 100 MG TABLET    Take 1 tablet by mouth daily    VITAMIN D (ERGOCALCIFEROL) 1.25 MG (78480 UT) CAPS CAPSULE    Take 1 capsule by mouth once a week for 11 days       ALLERGIES     Droperidol    FAMILY HISTORY       Family History   Problem Relation Age of Onset    Heart Disease Father           SOCIAL HISTORY       Social History     Socioeconomic History    Marital status: Single     Spouse name: Not on file    Number of children: Not on file    Years of education: Not on file    Highest education level: Not on file   Occupational History    Not on file   Social Needs    Financial resource strain: Not on file    Food insecurity     Worry: Not on file     Inability: Not on file    Transportation needs     Medical: Not on file     Non-medical: Not on file   Tobacco Use    Smoking status: Current Every Day Smoker     Packs/day: 2.00    Smokeless tobacco: Current User     Types: Chew   Substance and Sexual Activity    Alcohol use:  Yes     Alcohol/week: 12.0 standard drinks     Types: 12 Cans of beer per week    Drug use: Not Currently    Sexual activity: Not on file   Lifestyle    Physical activity     Days per week: Not on file     Minutes per session: Not on file    Stress: Not on file   Relationships    Social connections     Talks on phone: Not on file     Gets together: Not on file     Attends Congregation service: Not on file     Active member of club or organization: Not on file     Attends meetings of clubs or organizations: Not on file     Relationship status: Not on file    Intimate partner violence     Fear of current or ex partner: Not on file     Emotionally abused: Not on file     Physically abused: Not on file     Forced sexual activity: Not on file   Other Topics Concern    Not on file   Social History Narrative    Not on file       SCREENINGS             PHYSICAL EXAM    (up to 7 for level 4, 8 or more for level 5)     ED Triage Vitals [01/27/21 1934]   BP Temp Temp Source Pulse Resp SpO2 Height Weight   108/78 97.9 °F (36.6 °C) Oral 80 20 98 % 5' 7\" (1.702 m) 175 lb (79.4 kg)       Physical Exam  Vitals signs and nursing note reviewed. HENT:      Head: Atraumatic. Mouth/Throat:      Mouth: Mucous membranes are not dry. Pharynx: No posterior oropharyngeal erythema. Eyes:      General: No scleral icterus. Pupils: Pupils are equal, round, and reactive to light. Neck:      Trachea: No tracheal deviation. Cardiovascular:      Rate and Rhythm: Normal rate and regular rhythm. Pulses: Normal pulses. Heart sounds: Normal heart sounds. No murmur. Pulmonary:      Effort: Pulmonary effort is normal. No respiratory distress. Breath sounds: Normal breath sounds. No stridor. Abdominal:      General: There is no distension. Palpations: Abdomen is soft. Tenderness: There is no abdominal tenderness. There is no guarding. Skin:     Capillary Refill: Capillary refill takes less than 2 seconds. Coloration: Skin is not pale. Findings: No rash. Neurological:      Mental Status: He is disoriented and confused.          DIAGNOSTIC RESULTS       RADIOLOGY:  Non-plain film images such as CT, Ultrasound and MRI are read by the radiologistDi Tracey radiographic images are visualized and preliminarily interpreted bythe emergency physician with the below findings:        CT Head WO Contrast   Final Result   Impression:   No acute intracranial findings. Signed by Dr Glenn Rainey on 1/27/2021 6:36 PM              LABS:  Labs Reviewed   COMPREHENSIVE METABOLIC PANEL - Abnormal; Notable for the following components:       Result Value    Glucose 112 (*)     ALT 87 (*)     AST 70 (*)     All other components within normal limits   CBC WITH AUTO DIFFERENTIAL - Abnormal; Notable for the following components:    MCH 31.4 (*)     Lymphocytes % 41.2 (*)     All other components within normal limits   VALPROIC ACID LEVEL, TOTAL - Abnormal; Notable for the following components:    Valproic Acid Lvl <2.8 (*)     All other components within normal limits   RESPIRATORY PANEL, MOLECULAR, WITH COVID-19   URINE DRUG SCREEN   ETHANOL       All other labs were within normal range or not returned as of this dictation. EMERGENCY DEPARTMENT COURSE and DIFFERENTIAL DIAGNOSIS/MDM:   Vitals:    Vitals:    01/27/21 1934   BP: 108/78   Pulse: 80   Resp: 20   Temp: 97.9 °F (36.6 °C)   TempSrc: Oral   SpO2: 98%   Weight: 175 lb (79.4 kg)   Height: 5' 7\" (1.702 m)       MDM    Reassessment    Patient with a longstanding history of bipolar disorder with previous psychiatric admission. Does not appear that he has been compliant with his medications as he has a subtherapeutic Depakote level. He is severely intoxicated here in the ED, only able to answer some very basic questions and not really able to provide any history. He will require ongoing observation due to a serum alcohol level greater than 400. Given his previous history and for decisional making prior to arrival secondary to cold exposure he will need to be seen by psychiatry.     Patient now with increasing agitation, making some verbal threats towards ED staff along with saying that he is going to kill himself. I will place him on an involuntary 72-hour hold at this time. CONSULTS:    Case was discussed with Dr. Franklin Gutierrez regarding admission to the hospitalist service. PROCEDURES:  Unless otherwise noted below, none     Procedures    FINAL IMPRESSION      1. Very severe alcohol intoxication with complication ([address]    2. History of bipolar disorder    3.  Cold exposure, initial encounter          DISPOSITION/PLAN   DISPOSITION Admitted 01/27/2021 07:29:33 PM      (Please note that portions of this note were completed with a voice recognition program.  Efforts were made to edit thedictations but occasionally words are mis-transcribed.)    Mya Mckeon MD (electronically signed)  Attending Emergency Physician          Mya Mckeon MD  01/27/21 1947       Mya Mckeon MD  01/27/21 1954
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
## 2021-01-29 ENCOUNTER — CARE COORDINATION (OUTPATIENT)
Dept: CASE MANAGEMENT | Age: 41
End: 2021-01-29

## 2021-01-29 NOTE — CARE COORDINATION
[facility] [facility] Transitions Initial Follow Up Call    Call within 2 business days of discharge: Yes    Patient: Jaxon Caldera Patient : 1980   MRN: [id number]  Reason for Admission:   Discharge Date: 21 RARS: Readmission Risk Score: 11      Last Discharge [facility]       Complaint Diagnosis Description Type Department Provider    21 Alcohol Problem Very severe alcohol intoxication with complication ([facility] [address] . .. ED to Hosp-Admission (Discharged) (ADMITTED) L MED Javier Killian MD; Dion Oneill,... Spoke with: N/A    Facility: [place] [id number]    Non-face-to-face services provided:  Chart review for COVID    Care Transitions 24 Hour Call    Care Transitions Interventions         Follow Up : Attempted to make contact with Tamika Nesbitt initial follow up call post discharge from the hospital without success. Unable to leave a message regarding intent of call and call back information, as patient was unavailable. Will try again my next business day. No future appointments.     Marianna Lazcano RN

## 2021-02-01 ENCOUNTER — CARE COORDINATION (OUTPATIENT)
Dept: CASE MANAGEMENT | Age: 41
End: 2021-02-01

## 2021-02-01 NOTE — CARE COORDINATION
[facility] Transitions Initial Follow Up Call    Call within 2 business days of discharge: Yes    Patient: Francisca Parmar Patient : 1980   MRN: [id number]  Reason for Admission:   Discharge Date: 21 RARS: Readmission Risk Score: 11      Last Discharge [facility]       Complaint Diagnosis Description Type Department Provider    21 Alcohol Problem Very severe alcohol intoxication with complication ([address] . .. ED to Hosp-Admission (Discharged) (ADMITTED) Health system MED Yas Flores MD; Mya Mckeon,... Spoke with: N/A    Facility: [place] [id number]    Non-face-to-face services provided:  Chart review for COVID    Care Transitions 24 Hour Call    Care Transitions Interventions         Follow Up : Attempt #2 to make contact with Josias [facility] for [facility] initial follow up call post discharge from the hospital without success. Unable to leave a message regarding intent of call and call back information. Will resolve calls to patient at this time due to inability to make contact with patient. No future appointments.     Scott Leyva RN

## 2021-03-12 ENCOUNTER — APPOINTMENT (OUTPATIENT)
Dept: GENERAL RADIOLOGY | Age: 41
End: 2021-03-12
Payer: COMMERCIAL

## 2021-03-12 ENCOUNTER — APPOINTMENT (OUTPATIENT)
Dept: CT IMAGING | Age: 41
End: 2021-03-12
Payer: COMMERCIAL

## 2021-03-12 ENCOUNTER — HOSPITAL ENCOUNTER (EMERGENCY)
Age: 41
Discharge: HOME | End: 2021-03-12
Payer: COMMERCIAL

## 2021-03-12 VITALS
HEART RATE: 104 BPM | RESPIRATION RATE: 18 BRPM | OXYGEN SATURATION: 98 % | BODY MASS INDEX: 27.47 KG/M2 | DIASTOLIC BLOOD PRESSURE: 65 MMHG | WEIGHT: 175 LBS | HEIGHT: 67 IN | SYSTOLIC BLOOD PRESSURE: 138 MMHG | TEMPERATURE: 98.2 F

## 2021-03-12 DIAGNOSIS — M79.18 MUSCULOSKELETAL PAIN: ICD-10-CM
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
## 2021-03-12 PROCEDURE — 72128 CT CHEST SPINE W/O DYE: CPT

## 2021-03-12 PROCEDURE — 6370000000 HC RX 637 (ALT 250 FOR IP): Performed by: PHYSICIAN ASSISTANT

## 2021-03-12 PROCEDURE — 71101 X-RAY EXAM UNILAT RIBS/CHEST: CPT

## 2021-03-12 PROCEDURE — 72125 CT NECK SPINE W/O DYE: CPT

## 2021-03-12 PROCEDURE — 96372 THER/PROPH/DIAG INJ SC/IM: CPT

## 2021-03-12 PROCEDURE — 73030 X-RAY EXAM OF SHOULDER: CPT

## 2021-03-12 PROCEDURE — 99282 EMERGENCY DEPT VISIT SF MDM: CPT

## 2021-03-12 PROCEDURE — 6360000002 HC RX W HCPCS: Performed by: PHYSICIAN ASSISTANT

## 2021-03-12 RX ORDER — KETOROLAC TROMETHAMINE 30 MG/ML
30 INJECTION, SOLUTION INTRAMUSCULAR; INTRAVENOUS ONCE
Status: COMPLETED | OUTPATIENT
Start: 2021-03-12 | End: 2021-03-12

## 2021-03-12 RX ORDER — ORPHENADRINE CITRATE 30 MG/ML
60 INJECTION INTRAMUSCULAR; INTRAVENOUS ONCE
Status: COMPLETED | OUTPATIENT
Start: 2021-03-12 | End: 2021-03-12

## 2021-03-12 RX ORDER — NAPROXEN 250 MG/1
250 TABLET ORAL 2 TIMES DAILY WITH MEALS
Qty: 20 TABLET | Refills: 0 | Status: SHIPPED | OUTPATIENT
Start: 2021-03-12

## 2021-03-12 RX ORDER — MORPHINE SULFATE 4 MG/ML
2 INJECTION, SOLUTION INTRAMUSCULAR; INTRAVENOUS ONCE
Status: COMPLETED | OUTPATIENT
Start: 2021-03-12 | End: 2021-03-12

## 2021-03-12 RX ORDER — LIDOCAINE 4 G/G
1 PATCH TOPICAL DAILY
Status: DISCONTINUED | OUTPATIENT
Start: 2021-03-12 | End: 2021-03-12 | Stop reason: HOSPADM

## 2021-03-12 RX ORDER — PREDNISONE 10 MG/1
10 TABLET ORAL DAILY
Qty: 10 TABLET | Refills: 0 | Status: SHIPPED | OUTPATIENT
Start: 2021-03-12 | End: 2021-03-22

## 2021-03-12 RX ADMIN — ORPHENADRINE CITRATE 60 MG: 30 INJECTION INTRAMUSCULAR; INTRAVENOUS at 16:20

## 2021-03-12 RX ADMIN — MORPHINE SULFATE 2 MG: 4 INJECTION, SOLUTION INTRAMUSCULAR; INTRAVENOUS at 17:08

## 2021-03-12 RX ADMIN — KETOROLAC TROMETHAMINE 30 MG: 30 INJECTION, SOLUTION INTRAMUSCULAR; INTRAVENOUS at 16:19

## 2021-03-12 ASSESSMENT — ENCOUNTER SYMPTOMS
SHORTNESS OF BREATH: 0
EYE ITCHING: 0
APNEA: 0
COLOR CHANGE: 0
PHOTOPHOBIA: 0
EYE DISCHARGE: 0
BACK PAIN: 0
COUGH: 0

## 2021-03-12 ASSESSMENT — PAIN SCALES - GENERAL
PAINLEVEL_OUTOF10: 10
PAINLEVEL_OUTOF10: 9
PAINLEVEL_OUTOF10: 10
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
## 2021-03-14 ENCOUNTER — HOSPITAL ENCOUNTER (EMERGENCY)
Facility: HOSPITAL | Age: 41
Discharge: HOME | End: 2021-03-14
Attending: EMERGENCY MEDICINE | Admitting: EMERGENCY MEDICINE

## 2021-03-14 VITALS
BODY MASS INDEX: 25.92 KG/M2 | WEIGHT: 175 LBS | DIASTOLIC BLOOD PRESSURE: 83 MMHG | TEMPERATURE: 97.2 F | HEIGHT: 69 IN | OXYGEN SATURATION: 97 % | HEART RATE: 105 BPM | RESPIRATION RATE: 20 BRPM | SYSTOLIC BLOOD PRESSURE: 136 MMHG

## 2021-03-14 DIAGNOSIS — M25.512 ACUTE PAIN OF LEFT SHOULDER: Primary | ICD-10-CM

## 2021-03-14 PROCEDURE — 99283 EMERGENCY DEPT VISIT LOW MDM: CPT

## 2021-03-14 PROCEDURE — 96372 THER/PROPH/DIAG INJ SC/IM: CPT

## 2021-03-14 PROCEDURE — 25010000002 KETOROLAC TROMETHAMINE PER 15 MG: Performed by: EMERGENCY MEDICINE

## 2021-03-14 RX ORDER — KETOROLAC TROMETHAMINE 30 MG/ML
60 INJECTION, SOLUTION INTRAMUSCULAR; INTRAVENOUS ONCE
Status: COMPLETED | OUTPATIENT
Start: 2021-03-14 | End: 2021-03-14

## 2021-03-14 RX ADMIN — KETOROLAC TROMETHAMINE 60 MG: 30 INJECTION, SOLUTION INTRAMUSCULAR at 01:06
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
## 2021-03-23 ENCOUNTER — HOSPITAL ENCOUNTER (EMERGENCY)
Facility: HOSPITAL | Age: 41
Discharge: HOME | End: 2021-03-23
Admitting: EMERGENCY MEDICINE

## 2021-03-23 ENCOUNTER — APPOINTMENT (OUTPATIENT)
Dept: MRI IMAGING | Facility: HOSPITAL | Age: 41
End: 2021-03-23

## 2021-03-23 VITALS
SYSTOLIC BLOOD PRESSURE: 151 MMHG | BODY MASS INDEX: 26.68 KG/M2 | TEMPERATURE: 98.7 F | OXYGEN SATURATION: 96 % | HEIGHT: 67 IN | RESPIRATION RATE: 18 BRPM | WEIGHT: 170 LBS | HEART RATE: 99 BPM | DIASTOLIC BLOOD PRESSURE: 92 MMHG

## 2021-03-23 DIAGNOSIS — M50.30 DDD (DEGENERATIVE DISC DISEASE), CERVICAL: Primary | ICD-10-CM

## 2021-03-23 DIAGNOSIS — M62.838 MUSCLE SPASM: ICD-10-CM

## 2021-03-23 LAB
ALBUMIN SERPL-MCNC: 4.4 G/DL (ref 3.5–5.2)
ALBUMIN/GLOB SERPL: 1.8 G/DL
ALP SERPL-CCNC: 78 U/L (ref 39–117)
ALT SERPL W P-5'-P-CCNC: 23 U/L (ref 1–41)
AMPHET+METHAMPHET UR QL: NEGATIVE
AMPHETAMINES UR QL: NEGATIVE
ANION GAP SERPL CALCULATED.3IONS-SCNC: 12 MMOL/L (ref 5–15)
AST SERPL-CCNC: 23 U/L (ref 1–40)
BARBITURATES UR QL SCN: NEGATIVE
BASOPHILS # BLD AUTO: 0.05 10*3/MM3 (ref 0–0.2)
BASOPHILS NFR BLD AUTO: 0.6 % (ref 0–1.5)
BENZODIAZ UR QL SCN: NEGATIVE
BILIRUB SERPL-MCNC: 0.3 MG/DL (ref 0–1.2)
BUN SERPL-MCNC: 8 MG/DL (ref 6–20)
BUN/CREAT SERPL: 11.4 (ref 7–25)
BUPRENORPHINE SERPL-MCNC: NEGATIVE NG/ML
CALCIUM SPEC-SCNC: 8.8 MG/DL (ref 8.6–10.5)
CANNABINOIDS SERPL QL: NEGATIVE
CHLORIDE SERPL-SCNC: 105 MMOL/L (ref 98–107)
CO2 SERPL-SCNC: 23 MMOL/L (ref 22–29)
COCAINE UR QL: NEGATIVE
CREAT SERPL-MCNC: 0.7 MG/DL (ref 0.76–1.27)
DEPRECATED RDW RBC AUTO: 46.8 FL (ref 37–54)
EOSINOPHIL # BLD AUTO: 0.13 10*3/MM3 (ref 0–0.4)
EOSINOPHIL NFR BLD AUTO: 1.5 % (ref 0.3–6.2)
ERYTHROCYTE [DISTWIDTH] IN BLOOD BY AUTOMATED COUNT: 13.5 % (ref 12.3–15.4)
ERYTHROCYTE [SEDIMENTATION RATE] IN BLOOD: <1 MM/HR (ref 0–15)
ETHANOL UR QL: 0.07 %
GFR SERPL CREATININE-BSD FRML MDRD: 124 ML/MIN/1.73
GLOBULIN UR ELPH-MCNC: 2.4 GM/DL
GLUCOSE SERPL-MCNC: 104 MG/DL (ref 65–99)
HCT VFR BLD AUTO: 41.4 % (ref 37.5–51)
HGB BLD-MCNC: 13.9 G/DL (ref 13–17.7)
IMM GRANULOCYTES # BLD AUTO: 0.02 10*3/MM3 (ref 0–0.05)
IMM GRANULOCYTES NFR BLD AUTO: 0.2 % (ref 0–0.5)
LYMPHOCYTES # BLD AUTO: 2.2 10*3/MM3 (ref 0.7–3.1)
LYMPHOCYTES NFR BLD AUTO: 25.9 % (ref 19.6–45.3)
MCH RBC QN AUTO: 31.6 PG (ref 26.6–33)
MCHC RBC AUTO-ENTMCNC: 33.6 G/DL (ref 31.5–35.7)
MCV RBC AUTO: 94.1 FL (ref 79–97)
METHADONE UR QL SCN: NEGATIVE
MONOCYTES # BLD AUTO: 0.54 10*3/MM3 (ref 0.1–0.9)
MONOCYTES NFR BLD AUTO: 6.4 % (ref 5–12)
NEUTROPHILS NFR BLD AUTO: 5.54 10*3/MM3 (ref 1.7–7)
NEUTROPHILS NFR BLD AUTO: 65.4 % (ref 42.7–76)
NRBC BLD AUTO-RTO: 0 /100 WBC (ref 0–0.2)
OPIATES UR QL: NEGATIVE
OXYCODONE UR QL SCN: NEGATIVE
PCP UR QL SCN: NEGATIVE
PLATELET # BLD AUTO: 265 10*3/MM3 (ref 140–450)
PMV BLD AUTO: 9.7 FL (ref 6–12)
POTASSIUM SERPL-SCNC: 4 MMOL/L (ref 3.5–5.2)
PROPOXYPH UR QL: NEGATIVE
PROT SERPL-MCNC: 6.8 G/DL (ref 6–8.5)
RBC # BLD AUTO: 4.4 10*6/MM3 (ref 4.14–5.8)
SODIUM SERPL-SCNC: 140 MMOL/L (ref 136–145)
TRICYCLICS UR QL SCN: NEGATIVE
WBC # BLD AUTO: 8.48 10*3/MM3 (ref 3.4–10.8)

## 2021-03-23 PROCEDURE — 82077 ASSAY SPEC XCP UR&BREATH IA: CPT | Performed by: NURSE PRACTITIONER

## 2021-03-23 PROCEDURE — 25010000002 KETOROLAC TROMETHAMINE PER 15 MG: Performed by: NURSE PRACTITIONER

## 2021-03-23 PROCEDURE — 72141 MRI NECK SPINE W/O DYE: CPT

## 2021-03-23 PROCEDURE — 80306 DRUG TEST PRSMV INSTRMNT: CPT | Performed by: NURSE PRACTITIONER

## 2021-03-23 PROCEDURE — 96372 THER/PROPH/DIAG INJ SC/IM: CPT

## 2021-03-23 PROCEDURE — 25010000002 ORPHENADRINE CITRATE PER 60 MG: Performed by: NURSE PRACTITIONER

## 2021-03-23 PROCEDURE — 85651 RBC SED RATE NONAUTOMATED: CPT | Performed by: NURSE PRACTITIONER

## 2021-03-23 PROCEDURE — 85025 COMPLETE CBC W/AUTO DIFF WBC: CPT | Performed by: NURSE PRACTITIONER

## 2021-03-23 PROCEDURE — 99284 EMERGENCY DEPT VISIT MOD MDM: CPT

## 2021-03-23 PROCEDURE — 80053 COMPREHEN METABOLIC PANEL: CPT | Performed by: NURSE PRACTITIONER

## 2021-03-23 PROCEDURE — 36415 COLL VENOUS BLD VENIPUNCTURE: CPT

## 2021-03-23 RX ORDER — METHYLPREDNISOLONE 4 MG/1
TABLET ORAL
Qty: 21 EACH | Refills: 0 | Status: SHIPPED | OUTPATIENT
Start: 2021-03-23

## 2021-03-23 RX ORDER — KETOROLAC TROMETHAMINE 30 MG/ML
30 INJECTION, SOLUTION INTRAMUSCULAR; INTRAVENOUS ONCE
Status: DISCONTINUED | OUTPATIENT
Start: 2021-03-23 | End: 2021-03-23

## 2021-03-23 RX ORDER — NAPROXEN 250 MG/1
250 TABLET ORAL 2 TIMES DAILY PRN
COMMUNITY
End: 2021-03-23

## 2021-03-23 RX ORDER — ORPHENADRINE CITRATE 30 MG/ML
60 INJECTION INTRAMUSCULAR; INTRAVENOUS ONCE
Status: COMPLETED | OUTPATIENT
Start: 2021-03-23 | End: 2021-03-23

## 2021-03-23 RX ORDER — KETOROLAC TROMETHAMINE 30 MG/ML
60 INJECTION, SOLUTION INTRAMUSCULAR; INTRAVENOUS ONCE
Status: COMPLETED | OUTPATIENT
Start: 2021-03-23 | End: 2021-03-23

## 2021-03-23 RX ORDER — SODIUM CHLORIDE 0.9 % (FLUSH) 0.9 %
10 SYRINGE (ML) INJECTION AS NEEDED
Status: DISCONTINUED | OUTPATIENT
Start: 2021-03-23 | End: 2021-03-23 | Stop reason: HOSPADM

## 2021-03-23 RX ORDER — TIZANIDINE 4 MG/1
4 TABLET ORAL EVERY 8 HOURS PRN
Qty: 15 TABLET | Refills: 0 | Status: SHIPPED | OUTPATIENT
Start: 2021-03-23

## 2021-03-23 RX ORDER — PREDNISONE 10 MG/1
10 TABLET ORAL DAILY
COMMUNITY
End: 2021-03-23

## 2021-03-23 RX ADMIN — KETOROLAC TROMETHAMINE 60 MG: 60 INJECTION, SOLUTION INTRAMUSCULAR at 10:32

## 2021-03-23 RX ADMIN — ORPHENADRINE CITRATE 60 MG: 60 INJECTION INTRAMUSCULAR; INTRAVENOUS at 10:32

## 2021-03-23 NOTE — ED PROVIDER NOTES
Subjective   Patient is a 40-year-old white male presents the emergency department with 2-week history of neck pain that radiates into his left upper extremity.  He states he starting to have left upper extremity weakness and having difficulty picking things up with his left arm.  He denies any particular injury however he states he was moving furniture quite often there for about a week.  He states he has had a history of neck surgery several years ago.  He states that that his pain radiates into the left trapezius as well.  He states the most thing he is worried about today is the weakness in his arm.  Patient was seen at [facility] on March 12 and had a CT of the neck which showed old changes from his surgery and he was also seen here on March 13 for neck pain as well..      History provided by:  Patient   used: No        Review of Systems   Constitutional: Negative.    HENT: Negative.    Eyes: Negative.    Respiratory: Negative.    Cardiovascular: Negative.    Gastrointestinal: Negative.    Endocrine: Negative.    Genitourinary: Negative.    Musculoskeletal:        Patient is a 40-year-old white male presents the emergency department with 2-week history of neck pain that radiates into his left upper extremity.  He states he starting to have left upper extremity weakness and having difficulty picking things up with his left arm.  He denies any particular injury however he states he was moving furniture quite often there for about a week.  He states he has had a history of neck surgery several years ago.  He states that that his pain radiates into the left trapezius as well.  He states the most thing he is worried about today is the weakness in his arm.  Patient was seen at [facility] on March 12 and had a CT of the neck which showed old changes from his surgery and he was also seen here on March 13 for neck pain as well..     Skin: Negative.    Allergic/Immunologic: Negative.     Neurological: Negative.    Hematological: Negative.    Psychiatric/Behavioral: Negative.    All other systems reviewed and are negative.      Past Medical History:   Diagnosis Date   • Bipolar 1 disorder (CMS/HCC)    • Chronic neck pain    • Heart murmur    • Panic attack        Allergies   Allergen Reactions   • Droperidol Other (See Comments)     Shuts his body down like having  A seizure       Past Surgical History:   Procedure Laterality Date   • ANKLE SURGERY      x2   • COLONOSCOPY N/A 5/18/2017    Procedure: COLONOSCOPY WITH ANESTHESIA;  Surgeon: Paresh Robison DO;  Location:  [facility];  Service:    • COLONOSCOPY N/A 2/15/2018    Procedure: SIGMOIDOSCOPY;  Surgeon: Paresh Robison DO;  Location:  [facility];  Service:    • NECK SURGERY      x2   • NOSE SURGERY     • RHINOPLASTY     • WRIST SURGERY Right        Family History   Family history unknown: Yes       Social History     Socioeconomic History   • Marital status: Single     Spouse name: Not on file   • Number of children: Not on file   • Years of education: Not on file   • Highest education level: Not on file   Tobacco Use   • Smoking status: Former Smoker     Types: Cigarettes   • Smokeless tobacco: Current User     Types: Snuff   Substance and Sexual Activity   • Alcohol use: No   • Drug use: No       Prior to Admission medications    Medication Sig Start Date End Date Taking? Authorizing Provider   azelastine (OPTIVAR) 0.05 % ophthalmic solution 1 drop 2 (Two) Times a Day.    ProviderTamiko MD   clonazePAM (KlonoPIN) 1 MG tablet TK 1 T PO TID 4/28/17   ProviderTamiko MD   divalproex (DEPAKOTE) 500 MG 24 hr tablet TK 2 TS PO HS 4/28/17   Tamiko Kwan MD   lamoTRIgine (LaMICtal) 25 MG tablet Take 25 mg by mouth Daily.    ProviderTamiko MD   methocarbamol (ROBAXIN) 500 MG tablet TK 1 T PO BID PRN 4/28/17   Tamiko Kwan MD   QUEtiapine (SEROquel) 300 MG tablet TK 2 TS PO HS 4/28/17   Aniya  "MD Tamiko       /92   Pulse 99   Temp 98.7 °F (37.1 °C)   Resp 18   Ht 170.2 cm (67\")   Wt 77.1 kg (170 lb)   SpO2 96%   BMI 26.63 kg/m²     Objective   Physical Exam  Vitals and nursing note reviewed.   Constitutional:       Appearance: He is well-developed.      Comments: Nontoxic-appearing   HENT:      Head: Normocephalic and atraumatic.   Eyes:      Conjunctiva/sclera: Conjunctivae normal.      Pupils: Pupils are equal, round, and reactive to light.   Neck:      Comments: He has tenderness on palpation of posterior cervical spine and into the left sternocleidomastoid area and trapezius.   to the left side are diminished when compared to the right.  Patient is right-hand dominant.  DTRs are intact.  Peripheral pulses are palpable  Cardiovascular:      Rate and Rhythm: Normal rate and regular rhythm.      Heart sounds: Normal heart sounds.   Pulmonary:      Effort: Pulmonary effort is normal.      Breath sounds: Normal breath sounds.   Abdominal:      General: Bowel sounds are normal.      Palpations: Abdomen is soft.   Musculoskeletal:         General: Normal range of motion.      Cervical back: Normal range of motion and neck supple.   Skin:     General: Skin is warm and dry.   Neurological:      Mental Status: He is alert and oriented to person, place, and time.      Deep Tendon Reflexes: Reflexes are normal and symmetric.   Psychiatric:         Behavior: Behavior normal.         Thought Content: Thought content normal.         Judgment: Judgment normal.         Procedures         Lab Results (last 24 hours)     Procedure Component Value Units Date/Time    CBC & Differential [[id number]]  (Normal) Collected: 03/23/21 0857    Specimen: Blood Updated: 03/23/21 0928    Narrative:      The following orders were created for panel order CBC & Differential.  Procedure                               Abnormality         Status                     ---------                               -----------   "       ------                     CBC Auto Differential[[id number]]        Normal              Final result                 Please view results for these tests on the individual orders.    Comprehensive Metabolic Panel [[id number]]  (Abnormal) Collected: 03/23/21 0857    Specimen: Blood Updated: 03/23/21 0953     Glucose 104 mg/dL      BUN 8 mg/dL      Creatinine 0.70 mg/dL      Sodium 140 mmol/L      Potassium 4.0 mmol/L      Chloride 105 mmol/L      CO2 23.0 mmol/L      Calcium 8.8 mg/dL      Total Protein 6.8 g/dL      Albumin 4.40 g/dL      ALT (SGPT) 23 U/L      AST (SGOT) 23 U/L      Alkaline Phosphatase 78 U/L      Total Bilirubin 0.3 mg/dL      eGFR Non African Amer 124 mL/min/1.73      Globulin 2.4 gm/dL      A/G Ratio 1.8 g/dL      BUN/Creatinine Ratio 11.4     Anion Gap 12.0 mmol/L     Narrative:      GFR Normal >60  Chronic Kidney Disease <60  Kidney Failure <15      Sedimentation Rate [[id number]]  (Normal) Collected: 03/23/21 0857    Specimen: Blood Updated: 03/23/21 0943     Sed Rate <1 mm/hr     Ethanol [[id number]] Collected: 03/23/21 0857    Specimen: Blood Updated: 03/23/21 0953     Ethanol % 0.075 %     Narrative:      Not for legal purposes. Chain of Custody not followed.     CBC Auto Differential [[id number]]  (Normal) Collected: 03/23/21 0857    Specimen: Blood Updated: 03/23/21 0928     WBC 8.48 10*3/mm3      RBC 4.40 10*6/mm3      Hemoglobin 13.9 g/dL      Hematocrit 41.4 %      MCV 94.1 fL      MCH 31.6 pg      MCHC 33.6 g/dL      RDW 13.5 %      RDW-SD 46.8 fl      MPV 9.7 fL      Platelets 265 10*3/mm3      Neutrophil % 65.4 %      Lymphocyte % 25.9 %      Monocyte % 6.4 %      Eosinophil % 1.5 %      Basophil % 0.6 %      Immature Grans % 0.2 %      Neutrophils, Absolute 5.54 10*3/mm3      Lymphocytes, Absolute 2.20 10*3/mm3      Monocytes, Absolute 0.54 10*3/mm3      Eosinophils, Absolute 0.13 10*3/mm3      Basophils, Absolute 0.05 10*3/mm3      Immature Grans, Absolute 0.02 10*3/mm3       nRBC 0.0 /100 WBC     Urine Drug Screen - Urine, Clean Catch [[id number]]  (Normal) Collected: 03/23/21 1030    Specimen: Urine, Clean Catch Updated: 03/23/21 1058     THC, Screen, Urine Negative     Phencyclidine (PCP), Urine Negative     Cocaine Screen, Urine Negative     Methamphetamine, Ur Negative     Opiate Screen Negative     Amphetamine Screen, Urine Negative     Benzodiazepine Screen, Urine Negative     Tricyclic Antidepressants Screen Negative     Methadone Screen, Urine Negative     Barbiturates Screen, Urine Negative     Oxycodone Screen, Urine Negative     Propoxyphene Screen Negative     Buprenorphine, Screen, Urine Negative    Narrative:      Cutoff For Drugs Screened:    Amphetamines               500 ng/ml  Barbiturates               200 ng/ml  Benzodiazepines            150 ng/ml  Cocaine                    150 ng/ml  Methadone                  200 ng/ml  Opiates                    100 ng/ml  Phencyclidine               25 ng/ml  THC                            50 ng/ml  Methamphetamine            500 ng/ml  Tricyclic Antidepressants  300 ng/ml  Oxycodone                  100 ng/ml  Propoxyphene               300 ng/ml  Buprenorphine               10 ng/ml    The normal value for all drugs tested is negative. This report includes unconfirmed screening results, with the cutoff values listed, to be used for medical treatment purposes only.  Unconfirmed results must not be used for non-medical purposes such as employment or legal testing.  Clinical consideration should be applied to any drug of abuse test, particularly when unconfirmed results are used.            MRI Cervical Spine Without Contrast   Final Result   1. Prior anterior cervical fusion spanning C5-C7. Flattened lordosis. No   listhesis.   2. No significant spinal stenosis identified. The cervical cord is   normal in caliber. No compressive myelopathy.   3. There is a severe left-sided neuroforaminal narrowing at C3-C4, the   result of  advanced uncovertebral spurring.       This report was finalized on 03/23/2021 10:18 by Dr Derrick Vaca, .          ED Course  ED Course as of Mar 23 1304   Tue Mar 23, 2021   1033 Reviewed pt and pt care plan with dr gomez- also in agreement with pt care plan. Advised that he has ddd and needs to follow up with neurosurgery and pcp as well for follow up . Advised there is no cord compression. Pt will be discharged home soon in stable condition     [CW]      ED Course User Index  [CW] Kaylan Balderrama, APRCARL          MDM  Number of Diagnoses or Management Options  DDD (degenerative disc disease), cervical: minor  Muscle spasm: minor     Amount and/or Complexity of Data Reviewed  Clinical lab tests: ordered and reviewed  Tests in the radiology section of CPT®: ordered and reviewed    Patient Progress  Patient progress: stable      Final diagnoses:   DDD (degenerative disc disease), cervical   Muscle spasm          Kaylan Balderrama, APRN  03/23/21 1305

## 2021-08-28 ENCOUNTER — HOSPITAL ENCOUNTER (EMERGENCY)
Facility: HOSPITAL | Age: 41
Discharge: HOME | End: 2021-08-28
Attending: INTERNAL MEDICINE | Admitting: EMERGENCY MEDICINE

## 2021-08-28 ENCOUNTER — APPOINTMENT (OUTPATIENT)
Dept: GENERAL RADIOLOGY | Facility: HOSPITAL | Age: 41
End: 2021-08-28

## 2021-08-28 ENCOUNTER — APPOINTMENT (OUTPATIENT)
Dept: CT IMAGING | Facility: HOSPITAL | Age: 41
End: 2021-08-28

## 2021-08-28 VITALS
RESPIRATION RATE: 16 BRPM | DIASTOLIC BLOOD PRESSURE: 56 MMHG | SYSTOLIC BLOOD PRESSURE: 144 MMHG | BODY MASS INDEX: 23.03 KG/M2 | WEIGHT: 170 LBS | HEART RATE: 80 BPM | TEMPERATURE: 98.2 F | HEIGHT: 72 IN | OXYGEN SATURATION: 100 %

## 2021-08-28 DIAGNOSIS — F10.929 ALCOHOLIC INTOXICATION WITH COMPLICATION (HCC): ICD-10-CM
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
## 2021-08-28 LAB
AMPHET+METHAMPHET UR QL: POSITIVE
AMPHETAMINES UR QL: POSITIVE
BARBITURATES UR QL SCN: NEGATIVE
BENZODIAZ UR QL SCN: NEGATIVE
BUPRENORPHINE SERPL-MCNC: NEGATIVE NG/ML
CANNABINOIDS SERPL QL: NEGATIVE
COCAINE UR QL: NEGATIVE
ETHANOL UR QL: 0.07 %
ETHANOL UR QL: 0.24 %
ETHANOL UR QL: 0.37 %
HOLD SPECIMEN: NORMAL
HOLD SPECIMEN: NORMAL
METHADONE UR QL SCN: NEGATIVE
OPIATES UR QL: NEGATIVE
OXYCODONE UR QL SCN: NEGATIVE
PCP UR QL SCN: NEGATIVE
PROPOXYPH UR QL: NEGATIVE
TRICYCLICS UR QL SCN: NEGATIVE
WHOLE BLOOD HOLD SPECIMEN: NORMAL
WHOLE BLOOD HOLD SPECIMEN: NORMAL

## 2021-08-28 PROCEDURE — 82077 ASSAY SPEC XCP UR&BREATH IA: CPT | Performed by: PHYSICIAN ASSISTANT

## 2021-08-28 PROCEDURE — 82077 ASSAY SPEC XCP UR&BREATH IA: CPT | Performed by: EMERGENCY MEDICINE

## 2021-08-28 PROCEDURE — 72125 CT NECK SPINE W/O DYE: CPT

## 2021-08-28 PROCEDURE — 25010000002 ONDANSETRON PER 1 MG: Performed by: PHYSICIAN ASSISTANT

## 2021-08-28 PROCEDURE — 80306 DRUG TEST PRSMV INSTRMNT: CPT | Performed by: PHYSICIAN ASSISTANT

## 2021-08-28 PROCEDURE — 71045 X-RAY EXAM CHEST 1 VIEW: CPT

## 2021-08-28 PROCEDURE — 96374 THER/PROPH/DIAG INJ IV PUSH: CPT

## 2021-08-28 PROCEDURE — 72128 CT CHEST SPINE W/O DYE: CPT

## 2021-08-28 PROCEDURE — 99283 EMERGENCY DEPT VISIT LOW MDM: CPT

## 2021-08-28 PROCEDURE — 70450 CT HEAD/BRAIN W/O DYE: CPT

## 2021-08-28 PROCEDURE — 72131 CT LUMBAR SPINE W/O DYE: CPT

## 2021-08-28 RX ORDER — ONDANSETRON 2 MG/ML
4 INJECTION INTRAMUSCULAR; INTRAVENOUS ONCE
Status: COMPLETED | OUTPATIENT
Start: 2021-08-28 | End: 2021-08-28

## 2021-08-28 RX ORDER — SODIUM CHLORIDE 0.9 % (FLUSH) 0.9 %
10 SYRINGE (ML) INJECTION AS NEEDED
Status: DISCONTINUED | OUTPATIENT
Start: 2021-08-28 | End: 2021-08-28 | Stop reason: HOSPADM

## 2021-08-28 RX ADMIN — SODIUM CHLORIDE 1000 ML: 9 INJECTION, SOLUTION INTRAVENOUS at 00:32

## 2021-08-28 RX ADMIN — ONDANSETRON 4 MG: 2 INJECTION INTRAMUSCULAR; INTRAVENOUS at 00:32
